# Patient Record
Sex: MALE | URBAN - METROPOLITAN AREA
[De-identification: names, ages, dates, MRNs, and addresses within clinical notes are randomized per-mention and may not be internally consistent; named-entity substitution may affect disease eponyms.]

---

## 2020-03-02 PROBLEM — Z00.00 ENCOUNTER FOR PREVENTIVE HEALTH EXAMINATION: Status: ACTIVE | Noted: 2020-03-02

## 2020-03-04 ENCOUNTER — OUTPATIENT (OUTPATIENT)
Dept: OUTPATIENT SERVICES | Facility: HOSPITAL | Age: 85
LOS: 1 days | End: 2020-03-04
Payer: MEDICARE

## 2020-03-04 ENCOUNTER — APPOINTMENT (OUTPATIENT)
Dept: CT IMAGING | Facility: HOSPITAL | Age: 85
End: 2020-03-04

## 2020-03-04 DIAGNOSIS — Z00.8 ENCOUNTER FOR OTHER GENERAL EXAMINATION: ICD-10-CM

## 2020-03-04 PROCEDURE — 71250 CT THORAX DX C-: CPT | Mod: 26

## 2020-03-04 PROCEDURE — 71250 CT THORAX DX C-: CPT

## 2022-05-25 ENCOUNTER — INPATIENT (INPATIENT)
Facility: HOSPITAL | Age: 87
LOS: 15 days | Discharge: NURSING, REHAB OR LONG-TERM CARE | DRG: 435 | End: 2022-06-10
Attending: HOSPITALIST | Admitting: STUDENT IN AN ORGANIZED HEALTH CARE EDUCATION/TRAINING PROGRAM
Payer: MEDICARE

## 2022-05-25 VITALS
TEMPERATURE: 98 F | SYSTOLIC BLOOD PRESSURE: 92 MMHG | OXYGEN SATURATION: 93 % | HEART RATE: 68 BPM | RESPIRATION RATE: 18 BRPM | DIASTOLIC BLOOD PRESSURE: 55 MMHG

## 2022-05-25 DIAGNOSIS — R62.7 ADULT FAILURE TO THRIVE: ICD-10-CM

## 2022-05-25 LAB
ALBUMIN SERPL ELPH-MCNC: 3 G/DL — LOW (ref 3.3–5)
ALP SERPL-CCNC: 110 U/L — SIGNIFICANT CHANGE UP (ref 40–120)
ALT FLD-CCNC: 12 U/L — SIGNIFICANT CHANGE UP (ref 10–45)
AMMONIA BLD-MCNC: 16 UMOL/L — SIGNIFICANT CHANGE UP (ref 11–55)
ANION GAP SERPL CALC-SCNC: 12 MMOL/L — SIGNIFICANT CHANGE UP (ref 5–17)
APPEARANCE UR: CLEAR — SIGNIFICANT CHANGE UP
APTT BLD: 32.4 SEC — SIGNIFICANT CHANGE UP (ref 27.5–35.5)
AST SERPL-CCNC: 14 U/L — SIGNIFICANT CHANGE UP (ref 10–40)
BACTERIA # UR AUTO: NEGATIVE — SIGNIFICANT CHANGE UP
BASOPHILS # BLD AUTO: 0.02 K/UL — SIGNIFICANT CHANGE UP (ref 0–0.2)
BASOPHILS NFR BLD AUTO: 0.3 % — SIGNIFICANT CHANGE UP (ref 0–2)
BILIRUB DIRECT SERPL-MCNC: 0.1 MG/DL — SIGNIFICANT CHANGE UP (ref 0–0.3)
BILIRUB INDIRECT FLD-MCNC: 0.4 MG/DL — SIGNIFICANT CHANGE UP (ref 0.2–1)
BILIRUB SERPL-MCNC: 0.5 MG/DL — SIGNIFICANT CHANGE UP (ref 0.2–1.2)
BILIRUB SERPL-MCNC: 0.5 MG/DL — SIGNIFICANT CHANGE UP (ref 0.2–1.2)
BILIRUB UR-MCNC: ABNORMAL
BUN SERPL-MCNC: 21 MG/DL — SIGNIFICANT CHANGE UP (ref 7–23)
CALCIUM SERPL-MCNC: 9.4 MG/DL — SIGNIFICANT CHANGE UP (ref 8.4–10.5)
CHLORIDE SERPL-SCNC: 110 MMOL/L — HIGH (ref 96–108)
CO2 SERPL-SCNC: 18 MMOL/L — LOW (ref 22–31)
COD CRY URNS QL: ABNORMAL
COLOR SPEC: YELLOW — SIGNIFICANT CHANGE UP
CREAT SERPL-MCNC: 1.3 MG/DL — SIGNIFICANT CHANGE UP (ref 0.5–1.3)
DIFF PNL FLD: ABNORMAL
EGFR: 51 ML/MIN/1.73M2 — LOW
EOSINOPHIL # BLD AUTO: 0.01 K/UL — SIGNIFICANT CHANGE UP (ref 0–0.5)
EOSINOPHIL NFR BLD AUTO: 0.1 % — SIGNIFICANT CHANGE UP (ref 0–6)
EPI CELLS # UR: 5 /HPF — SIGNIFICANT CHANGE UP
GLUCOSE SERPL-MCNC: 104 MG/DL — HIGH (ref 70–99)
GLUCOSE UR QL: NEGATIVE — SIGNIFICANT CHANGE UP
HCT VFR BLD CALC: 35.6 % — LOW (ref 39–50)
HGB BLD-MCNC: 10.5 G/DL — LOW (ref 13–17)
HYALINE CASTS # UR AUTO: 35 /LPF — HIGH (ref 0–2)
IMM GRANULOCYTES NFR BLD AUTO: 0.6 % — SIGNIFICANT CHANGE UP (ref 0–1.5)
INR BLD: 1.17 RATIO — HIGH (ref 0.88–1.16)
KETONES UR-MCNC: NEGATIVE — SIGNIFICANT CHANGE UP
LEUKOCYTE ESTERASE UR-ACNC: NEGATIVE — SIGNIFICANT CHANGE UP
LIDOCAIN IGE QN: 25 U/L — SIGNIFICANT CHANGE UP (ref 7–60)
LYMPHOCYTES # BLD AUTO: 0.73 K/UL — LOW (ref 1–3.3)
LYMPHOCYTES # BLD AUTO: 10.4 % — LOW (ref 13–44)
MAGNESIUM SERPL-MCNC: 2 MG/DL — SIGNIFICANT CHANGE UP (ref 1.6–2.6)
MCHC RBC-ENTMCNC: 27.1 PG — SIGNIFICANT CHANGE UP (ref 27–34)
MCHC RBC-ENTMCNC: 29.5 GM/DL — LOW (ref 32–36)
MCV RBC AUTO: 92 FL — SIGNIFICANT CHANGE UP (ref 80–100)
MONOCYTES # BLD AUTO: 0.74 K/UL — SIGNIFICANT CHANGE UP (ref 0–0.9)
MONOCYTES NFR BLD AUTO: 10.5 % — SIGNIFICANT CHANGE UP (ref 2–14)
NEUTROPHILS # BLD AUTO: 5.48 K/UL — SIGNIFICANT CHANGE UP (ref 1.8–7.4)
NEUTROPHILS NFR BLD AUTO: 78.1 % — HIGH (ref 43–77)
NITRITE UR-MCNC: NEGATIVE — SIGNIFICANT CHANGE UP
NRBC # BLD: 0 /100 WBCS — SIGNIFICANT CHANGE UP (ref 0–0)
PH UR: 6 — SIGNIFICANT CHANGE UP (ref 5–8)
PLATELET # BLD AUTO: 165 K/UL — SIGNIFICANT CHANGE UP (ref 150–400)
POTASSIUM SERPL-MCNC: 4 MMOL/L — SIGNIFICANT CHANGE UP (ref 3.5–5.3)
POTASSIUM SERPL-SCNC: 4 MMOL/L — SIGNIFICANT CHANGE UP (ref 3.5–5.3)
PROT SERPL-MCNC: 6.3 G/DL — SIGNIFICANT CHANGE UP (ref 6–8.3)
PROT UR-MCNC: ABNORMAL
PROTHROM AB SERPL-ACNC: 13.6 SEC — HIGH (ref 10.5–13.4)
RBC # BLD: 3.87 M/UL — LOW (ref 4.2–5.8)
RBC # FLD: 16.1 % — HIGH (ref 10.3–14.5)
RBC CASTS # UR COMP ASSIST: 4 /HPF — SIGNIFICANT CHANGE UP (ref 0–4)
SARS-COV-2 RNA SPEC QL NAA+PROBE: SIGNIFICANT CHANGE UP
SODIUM SERPL-SCNC: 140 MMOL/L — SIGNIFICANT CHANGE UP (ref 135–145)
SP GR SPEC: 1.02 — SIGNIFICANT CHANGE UP (ref 1.01–1.02)
UROBILINOGEN FLD QL: NEGATIVE — SIGNIFICANT CHANGE UP
WBC # BLD: 7.02 K/UL — SIGNIFICANT CHANGE UP (ref 3.8–10.5)
WBC # FLD AUTO: 7.02 K/UL — SIGNIFICANT CHANGE UP (ref 3.8–10.5)
WBC UR QL: 6 /HPF — HIGH (ref 0–5)

## 2022-05-25 PROCEDURE — 99223 1ST HOSP IP/OBS HIGH 75: CPT | Mod: 25

## 2022-05-25 PROCEDURE — 74177 CT ABD & PELVIS W/CONTRAST: CPT | Mod: 26,MA

## 2022-05-25 PROCEDURE — 99285 EMERGENCY DEPT VISIT HI MDM: CPT

## 2022-05-25 PROCEDURE — 70450 CT HEAD/BRAIN W/O DYE: CPT | Mod: 26,MA

## 2022-05-25 PROCEDURE — 71250 CT THORAX DX C-: CPT | Mod: 26,MA

## 2022-05-25 PROCEDURE — 99497 ADVNCD CARE PLAN 30 MIN: CPT | Mod: 25

## 2022-05-25 NOTE — H&P ADULT - HISTORY OF PRESENT ILLNESS
89M former smoker w/ copd, CAD s/p CABG, hypothyroid presents for evaluation of chronic diarrhea and weight loss. The patient reports that over the course of 1 year he has lost over 60lb and over the last 3-4mo he has experienced nonbloody watery diarrhea. He reports seeking medical attention at a nearby hospital but cannot recall the name ([place] vs [facility]?) and was told that he had spots on his liver and that he would need to follow up with his pcp. He reports not following with his pcp and not seeing his doctor in some time. No reported cancer in family. He has been experiencing fatigue, loss of appetite and dry cough as well. No reported fever, chills, cp, sob, n/v, or painful urination. Significantly, the patient lives with an  older sister (Poppy age 99) and has been having difficulty caring for himself. He reports being concerned he may have cancer and would want to know if anything could be done but if chances of remission are limited then he would not want aggressive care and would want to be "let go."    Of note, he does not know his home medications but says pharmacy is in [place]. He would want his niece Nel to be involved with his care and asks for her to be called tomorrow.

## 2022-05-25 NOTE — ED PROVIDER NOTE - CLINICAL SUMMARY MEDICAL DECISION MAKING FREE TEXT BOX
Marta: 89 year old male with diarrhea and weight loss over past few months. Patient very poor historian. states no pain. dry mm. will get labs, ivf, ct a/p, attempt to reach family for collateral, reassess Marta: 89 year old male with diarrhea and weight loss over past few months. Patient very poor historian. states no pain. dry mm. will get labs, ivf, ct a/p, attempt to reach family for collateral, reassess    Patient is a 89 year old male with past medical history significant for unspecified liver mass presents to the Emergency Department with chief complaint of diarrhea.  Patient was evaluated for infectious and intra-abdominal pathology.  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  Niece unaware of diagnosis and states patient has been getting worse at home and unable to care for himself.

## 2022-05-25 NOTE — ED ADULT NURSE NOTE - OBJECTIVE STATEMENT
PT is a 89 year old A&OX3 male with PMH of COPD, HLD, hypothyroidism, heart disease, and is extremely hard of hearing but does not have hearing aids. PT presents to the ED via EMS with c/o diarrhea for 5 months. Per EMS, PT "has been having diarrhea for months and his niece took him to another hospital where they did a liver biopsy with unknown results." PT is very poor historian and has no family at the bedside. Per EMS, there is no nicki blood in the stool and PT is incontinent. PT denies pain. PT is sitting up comfortably in bed, breathing unlabored, and speaking in short sentences. Abdomen is soft, non-tender, and non-distended. Skin is warm and dry, no diaphoresis noted. No edema noted to B/L extremities. PT placed in hospital gown, non-slip red socks applied. PT ambulatory with 1 assist. Safety and comfort maintained. PT is a 89 year old A&OX3 male with PMH of COPD, HLD, hypothyroidism, heart disease, and is extremely hard of hearing but does not have hearing aids. PT presents to the ED via EMS with c/o diarrhea for 5 months. Per EMS, PT "has been having diarrhea for months and his niece took him to another hospital where they did a liver biopsy with unknown results." PT is very poor historian and has no family at the bedside. Per EMS, there is no nicki blood in the stool and PT is incontinent. PT denies pain. PT is sitting up comfortably in bed, breathing unlabored, and speaking in short sentences. Abdomen is soft, non-tender, and non-distended. Skin is warm and dry, no diaphoresis noted. No edema noted to B/L extremities. Stage 2 pressure injury noted to left buttock. PT placed in hospital gown, non-slip red socks applied. PT ambulatory with 1 assist. Safety and comfort maintained.

## 2022-05-25 NOTE — H&P ADULT - NSHPREVIEWOFSYSTEMS_GEN_ALL_CORE
CONSTITUTIONAL: No fever, no chills, weight loss+  EYES: No eye pain, no acute blindness  ENMT: no pain in mouth, no cuts on tongue  RESPIRATORY: No cough, No sob  CARDIOVASCULAR: No CP, no palpitations  GASTROINTESTINAL: no abdominal pain, diarrhea+  GENITOURINARY: No dysuria, no hematuria  Heme/Lymph: No easy bruising, no swelling of neck lymph nodes  NEUROLOGICAL: No seizure, No acute paralysis  SKIN: No itching, no rashes  MUSCULOSKELETAL: No acute joint pain, no joint swelling

## 2022-05-25 NOTE — ED PROVIDER NOTE - PHYSICAL EXAMINATION
PHYSICAL EXAM:  GENERAL: + frail appearing   HEAD:  Atraumatic, Normocephalic  EYES: EOMI, PERRLA, conjunctiva and sclera clear  ENT: No erythema/pallor/petechiae/lesions; TMs clear b/l  NECK: Supple, No JVD  LUNG: CTA b/l; no r/r/w  HEART: RRR, +S1/S2; No m/r/g  ABDOMEN: soft, NT/ND; BS audible   EXTREMITIES:  2+ Peripheral Pulses, brisk cap refill. No clubbing, cyanosis, or edema  NERVOUS SYSTEM:  AAOx3, speech clear. No sensory/motor deficits   MSK: FROM all 4 extremities, full and equal strength  SKIN: No rashes or lesions

## 2022-05-25 NOTE — ED ADULT NURSE REASSESSMENT NOTE - NS ED NURSE REASSESS COMMENT FT1
pt reports urinating and small amount of stool passed. pt cleansed and changed. adjusted in bed for comfort

## 2022-05-25 NOTE — ED ADULT NURSE REASSESSMENT NOTE - NS ED NURSE REASSESS COMMENT FT1
PT straight catheterized under sterile technique with 2 RN's at the bedside as per MD order. PT tolerated well. Approximately 75 mL clear, yellow urine drained. UA/UC sent as per MD order. Safety and comfort maintained. Call bell within reach.

## 2022-05-25 NOTE — H&P ADULT - ASSESSMENT
89M admit for metastatic carcinoid tumor 89M former smoker w/ copd, CAD s/p CABG, hypothyroid presents for evaluation of chronic diarrhea and weight loss found to have imaging concerning for metastatic carcinoid tumor

## 2022-05-25 NOTE — ED ADULT NURSE NOTE - NSIMPLEMENTINTERV_GEN_ALL_ED
Implemented All Fall with Harm Risk Interventions:  [place] to call system. Call bell, personal items and telephone within reach. Instruct patient to call for assistance. Room bathroom lighting operational. Non-slip footwear when patient is off stretcher. Physically safe environment: no spills, clutter or unnecessary equipment. Stretcher in lowest position, wheels locked, appropriate side rails in place. Provide visual cue, wrist band, yellow gown, etc. Monitor gait and stability. Monitor for mental status changes and reorient to person, place, and time. Review medications for side effects contributing to fall risk. Reinforce activity limits and safety measures with patient and family. Provide visual clues: red socks.

## 2022-05-25 NOTE — H&P ADULT - PROBLEM SELECTOR PLAN 2
patient defers to niece regarding starting ac for now  CT a/p imaging concerning for possible DVT in R common femoral vein  lovenox at dvt ppx for now, would be best to readdress in the am

## 2022-05-25 NOTE — ED PROVIDER NOTE - OBJECTIVE STATEMENT
Patient is a 89 year old male with past medical history significant for unspecified liver mass presents to the Emergency Department with chief complaint of diarrhea.  Patient states for the past 3-4 months he has had constant diarrhea.  Patient reports less oral intake.  Patient lives at home with his niece, who states his physical status has declined.  Patient had a liver biopsy performed at an outside hospital, which was significant for carcinoid tumor.  Patient notes abdominal discomfort.  Patient denies sick contacts or recent travel.

## 2022-05-25 NOTE — H&P ADULT - PROBLEM SELECTOR PLAN 7
Health Care Decision Maker: Mr. Dustin Lutz (Patient- A&Ox3)    I had a face-to-face discussion with the patient for 20 minutes on 5/25->5/26 regarding advanced care planning. I provided a detailed explanation of advanced directives including CPR resuscitation, intubation, and life support measures. A MOLST form was completed and placed in the chart. The patient has determined that the harm of resuscitative measures outweighs perceived benefit and has designated code status to be DNR/DNI    Billing Code: 12869

## 2022-05-25 NOTE — ED PROVIDER NOTE - PROGRESS NOTE DETAILS
DO Scott: patient's niece contacted at [phone] and stated she was unaware of diagnosis and patient not following with a particular primary doctor. Jadon PGY 1 Received sign out regarding patient, will follow up with labs, imaging.   Currently will admit for FTT, Jadon PGY 1 Spoke w/ HCP no AC at this time understands risk of clot

## 2022-05-25 NOTE — H&P ADULT - NSHPPHYSICALEXAM_GEN_ALL_CORE
Vital Signs Last 24 Hrs  T(C): 36.3 (26 May 2022 00:31), Max: 36.5 (25 May 2022 22:58)  T(F): 97.4 (26 May 2022 00:31), Max: 97.7 (25 May 2022 22:58)  HR: 54 (26 May 2022 00:31) (54 - 70)  BP: 122/79 (26 May 2022 00:31) (92/55 - 153/66)  BP(mean): 95 (25 May 2022 22:58) (74 - 95)  RR: 20 (26 May 2022 00:31) (18 - 20)  SpO2: 98% (26 May 2022 00:31) (93% - 100%) on RA    GENERAL: NAD, cachectic  HEAD:  Atraumatic, Normocephalic  EYES: EOMI, PERRL, conjunctiva and sclera clear  ENMT: MMM, good dentition  NECK: Supple, trachea midline  Lung: normal work of breathing, cta b/l  Cardiovascular: S1&S2+, rrr, no m/r/g appreciated; no pitting edema appreciated in b/l LE  ABDOMEN: bs+, soft, nt, nd, no appreciable masses  : No van catheter, no CVA tenderness  Neuro: A&Ox3, no flaccid paralysis in extremities appreciated  SKIN: warm and dry, no visible purulence in exposed areas, normal skin turgor, decubitus ulcer

## 2022-05-25 NOTE — H&P ADULT - NSHPLABSRESULTS_GEN_ALL_CORE
Personally reviewed available labs, imaging and ekg  [1]  CBC Full  -  ( 25 May 2022 16:13 )  WBC Count : 7.02 K/uL  RBC Count : 3.87 M/uL  Hemoglobin : 10.5 g/dL  Hematocrit : 35.6 %  Platelet Count - Automated : 165 K/uL  Mean Cell Volume : 92.0 fl  Mean Cell Hemoglobin : 27.1 pg  Mean Cell Hemoglobin Concentration : 29.5 gm/dL  Auto Neutrophil # : 5.48 K/uL  Auto Lymphocyte # : 0.73 K/uL  Auto Monocyte # : 0.74 K/uL  Auto Eosinophil # : 0.01 K/uL  Auto Basophil # : 0.02 K/uL  Auto Neutrophil % : 78.1 %  Auto Lymphocyte % : 10.4 %  Auto Monocyte % : 10.5 %  Auto Eosinophil % : 0.1 %  Auto Basophil % : 0.3 %    05-25    140  |  110<H>  |  21  ----------------------------<  104<H>  4.0   |  18<L>  |  1.30    Ca    9.4      25 May 2022 16:13  Mg     2.0     05-25    TPro  6.3  /  Alb  3.0<L>  /  TBili  0.5  /  DBili  0.1  /  AST  14  /  ALT  12  /  AlkPhos  110  05-25    PT/INR - ( 25 May 2022 17:33 )   PT: 13.6 sec;   INR: 1.17 ratio         PTT - ( 25 May 2022 17:33 )  PTT:32.4 sec  Imaging:  [ 2] I independently reviewed CT abdomen concerning ofr metastatic cancer  EKG:  [2] I independently reviewed EKG/cardiac tracing and sinus present

## 2022-05-25 NOTE — ED PROVIDER NOTE - NS ED ROS FT
CONSTITUTIONAL: No weakness, fevers or chills  EYES/ENT: No visual changes;  No vertigo or throat pain   NECK: No pain or stiffness  RESPIRATORY: No cough, wheezing, hemoptysis; No shortness of breath  CARDIOVASCULAR: No chest pain or palpitations  GASTROINTESTINAL: + diarrhea, abdominal pain  GENITOURINARY: No dysuria, frequency or hematuria  NEUROLOGICAL: No numbness or weakness  SKIN: No itching, burning, rashes, or lesions   All other review of systems is negative unless indicated above.

## 2022-05-25 NOTE — H&P ADULT - PROBLEM SELECTOR PLAN 1
imaging concerning for met carcinoid  patient would want to speak with oncologist prior to considering biopsy to determine if therapy would be reasonable at his age- onc consult placed via email

## 2022-05-25 NOTE — H&P ADULT - NSHPADDITIONALINFOADULT_GEN_ALL_CORE
full h&p to follow    Sohan Walters MD  Medicine Attending  Department of Hospital Medicine  pager: [phone] (available from 20:00 to 08:00) Sohan Walters MD  Medicine Attending  Department of Hospital Medicine  pager: [phone] (available from 20:00 to 08:00)

## 2022-05-26 DIAGNOSIS — Z71.89 OTHER SPECIFIED COUNSELING: ICD-10-CM

## 2022-05-26 DIAGNOSIS — Z95.1 PRESENCE OF AORTOCORONARY BYPASS GRAFT: Chronic | ICD-10-CM

## 2022-05-26 DIAGNOSIS — Z79.899 OTHER LONG TERM (CURRENT) DRUG THERAPY: ICD-10-CM

## 2022-05-26 DIAGNOSIS — D3A.00 BENIGN CARCINOID TUMOR OF UNSPECIFIED SITE: ICD-10-CM

## 2022-05-26 DIAGNOSIS — Z29.9 ENCOUNTER FOR PROPHYLACTIC MEASURES, UNSPECIFIED: ICD-10-CM

## 2022-05-26 DIAGNOSIS — I82.409 ACUTE EMBOLISM AND THROMBOSIS OF UNSPECIFIED DEEP VEINS OF UNSPECIFIED LOWER EXTREMITY: ICD-10-CM

## 2022-05-26 DIAGNOSIS — R09.89 OTHER SPECIFIED SYMPTOMS AND SIGNS INVOLVING THE CIRCULATORY AND RESPIRATORY SYSTEMS: ICD-10-CM

## 2022-05-26 DIAGNOSIS — D64.9 ANEMIA, UNSPECIFIED: ICD-10-CM

## 2022-05-26 DIAGNOSIS — E03.9 HYPOTHYROIDISM, UNSPECIFIED: ICD-10-CM

## 2022-05-26 DIAGNOSIS — L89.90 PRESSURE ULCER OF UNSPECIFIED SITE, UNSPECIFIED STAGE: ICD-10-CM

## 2022-05-26 DIAGNOSIS — J43.9 EMPHYSEMA, UNSPECIFIED: ICD-10-CM

## 2022-05-26 LAB
ALBUMIN SERPL ELPH-MCNC: 2.8 G/DL — LOW (ref 3.3–5)
ALP SERPL-CCNC: 107 U/L — SIGNIFICANT CHANGE UP (ref 40–120)
ALT FLD-CCNC: 11 U/L — SIGNIFICANT CHANGE UP (ref 10–45)
ANION GAP SERPL CALC-SCNC: 9 MMOL/L — SIGNIFICANT CHANGE UP (ref 5–17)
APTT BLD: 34.7 SEC — SIGNIFICANT CHANGE UP (ref 27.5–35.5)
AST SERPL-CCNC: 11 U/L — SIGNIFICANT CHANGE UP (ref 10–40)
BASOPHILS # BLD AUTO: 0.02 K/UL — SIGNIFICANT CHANGE UP (ref 0–0.2)
BASOPHILS NFR BLD AUTO: 0.3 % — SIGNIFICANT CHANGE UP (ref 0–2)
BILIRUB SERPL-MCNC: 0.5 MG/DL — SIGNIFICANT CHANGE UP (ref 0.2–1.2)
BUN SERPL-MCNC: 19 MG/DL — SIGNIFICANT CHANGE UP (ref 7–23)
C DIFF GDH STL QL: NEGATIVE — SIGNIFICANT CHANGE UP
C DIFF GDH STL QL: SIGNIFICANT CHANGE UP
CALCIUM SERPL-MCNC: 9.2 MG/DL — SIGNIFICANT CHANGE UP (ref 8.4–10.5)
CHLORIDE SERPL-SCNC: 109 MMOL/L — HIGH (ref 96–108)
CO2 SERPL-SCNC: 21 MMOL/L — LOW (ref 22–31)
CREAT SERPL-MCNC: 1.11 MG/DL — SIGNIFICANT CHANGE UP (ref 0.5–1.3)
CULTURE RESULTS: SIGNIFICANT CHANGE UP
EGFR: 62 ML/MIN/1.73M2 — SIGNIFICANT CHANGE UP
EOSINOPHIL # BLD AUTO: 0.02 K/UL — SIGNIFICANT CHANGE UP (ref 0–0.5)
EOSINOPHIL NFR BLD AUTO: 0.3 % — SIGNIFICANT CHANGE UP (ref 0–6)
FERRITIN SERPL-MCNC: 67 NG/ML — SIGNIFICANT CHANGE UP (ref 30–400)
FOLATE SERPL-MCNC: 16.1 NG/ML — SIGNIFICANT CHANGE UP
GLUCOSE SERPL-MCNC: 80 MG/DL — SIGNIFICANT CHANGE UP (ref 70–99)
HCT VFR BLD CALC: 35.1 % — LOW (ref 39–50)
HGB BLD-MCNC: 10.6 G/DL — LOW (ref 13–17)
IMM GRANULOCYTES NFR BLD AUTO: 0.5 % — SIGNIFICANT CHANGE UP (ref 0–1.5)
INR BLD: 1.15 RATIO — SIGNIFICANT CHANGE UP (ref 0.88–1.16)
IRON SATN MFR SERPL: 17 % — SIGNIFICANT CHANGE UP (ref 16–55)
IRON SATN MFR SERPL: 32 UG/DL — LOW (ref 45–165)
LYMPHOCYTES # BLD AUTO: 1.1 K/UL — SIGNIFICANT CHANGE UP (ref 1–3.3)
LYMPHOCYTES # BLD AUTO: 17.5 % — SIGNIFICANT CHANGE UP (ref 13–44)
MCHC RBC-ENTMCNC: 27.6 PG — SIGNIFICANT CHANGE UP (ref 27–34)
MCHC RBC-ENTMCNC: 30.2 GM/DL — LOW (ref 32–36)
MCV RBC AUTO: 91.4 FL — SIGNIFICANT CHANGE UP (ref 80–100)
MONOCYTES # BLD AUTO: 0.85 K/UL — SIGNIFICANT CHANGE UP (ref 0–0.9)
MONOCYTES NFR BLD AUTO: 13.5 % — SIGNIFICANT CHANGE UP (ref 2–14)
NEUTROPHILS # BLD AUTO: 4.27 K/UL — SIGNIFICANT CHANGE UP (ref 1.8–7.4)
NEUTROPHILS NFR BLD AUTO: 67.9 % — SIGNIFICANT CHANGE UP (ref 43–77)
NRBC # BLD: 0 /100 WBCS — SIGNIFICANT CHANGE UP (ref 0–0)
PLATELET # BLD AUTO: 151 K/UL — SIGNIFICANT CHANGE UP (ref 150–400)
POTASSIUM SERPL-MCNC: 3.9 MMOL/L — SIGNIFICANT CHANGE UP (ref 3.5–5.3)
POTASSIUM SERPL-SCNC: 3.9 MMOL/L — SIGNIFICANT CHANGE UP (ref 3.5–5.3)
PROT SERPL-MCNC: 5.9 G/DL — LOW (ref 6–8.3)
PROTHROM AB SERPL-ACNC: 13.4 SEC — SIGNIFICANT CHANGE UP (ref 10.5–13.4)
RBC # BLD: 3.84 M/UL — LOW (ref 4.2–5.8)
RBC # BLD: 3.84 M/UL — LOW (ref 4.2–5.8)
RBC # FLD: 16.1 % — HIGH (ref 10.3–14.5)
RETICS #: 57.6 K/UL — SIGNIFICANT CHANGE UP (ref 25–125)
RETICS/RBC NFR: 1.5 % — SIGNIFICANT CHANGE UP (ref 0.5–2.5)
SODIUM SERPL-SCNC: 139 MMOL/L — SIGNIFICANT CHANGE UP (ref 135–145)
SPECIMEN SOURCE: SIGNIFICANT CHANGE UP
TIBC SERPL-MCNC: 189 UG/DL — LOW (ref 220–430)
TSH SERPL-MCNC: 0.93 UIU/ML — SIGNIFICANT CHANGE UP (ref 0.27–4.2)
UIBC SERPL-MCNC: 157 UG/DL — SIGNIFICANT CHANGE UP (ref 110–370)
VIT B12 SERPL-MCNC: 818 PG/ML — SIGNIFICANT CHANGE UP (ref 232–1245)
WBC # BLD: 6.29 K/UL — SIGNIFICANT CHANGE UP (ref 3.8–10.5)
WBC # FLD AUTO: 6.29 K/UL — SIGNIFICANT CHANGE UP (ref 3.8–10.5)

## 2022-05-26 PROCEDURE — 99233 SBSQ HOSP IP/OBS HIGH 50: CPT

## 2022-05-26 PROCEDURE — 99222 1ST HOSP IP/OBS MODERATE 55: CPT | Mod: GC

## 2022-05-26 RX ORDER — LEVOTHYROXINE SODIUM 125 MCG
88 TABLET ORAL DAILY
Refills: 0 | Status: DISCONTINUED | OUTPATIENT
Start: 2022-05-26 | End: 2022-06-10

## 2022-05-26 RX ORDER — LOPERAMIDE HCL 2 MG
2 TABLET ORAL EVERY 4 HOURS
Refills: 0 | Status: DISCONTINUED | OUTPATIENT
Start: 2022-05-26 | End: 2022-05-26

## 2022-05-26 RX ORDER — FINASTERIDE 5 MG/1
5 TABLET, FILM COATED ORAL DAILY
Refills: 0 | Status: DISCONTINUED | OUTPATIENT
Start: 2022-05-26 | End: 2022-06-10

## 2022-05-26 RX ORDER — ATORVASTATIN CALCIUM 80 MG/1
80 TABLET, FILM COATED ORAL AT BEDTIME
Refills: 0 | Status: DISCONTINUED | OUTPATIENT
Start: 2022-05-26 | End: 2022-06-10

## 2022-05-26 RX ORDER — ASPIRIN/CALCIUM CARB/MAGNESIUM 324 MG
81 TABLET ORAL DAILY
Refills: 0 | Status: DISCONTINUED | OUTPATIENT
Start: 2022-05-26 | End: 2022-05-27

## 2022-05-26 RX ORDER — ENOXAPARIN SODIUM 100 MG/ML
40 INJECTION SUBCUTANEOUS EVERY 24 HOURS
Refills: 0 | Status: DISCONTINUED | OUTPATIENT
Start: 2022-05-26 | End: 2022-05-30

## 2022-05-26 RX ORDER — ISOSORBIDE MONONITRATE 60 MG/1
30 TABLET, EXTENDED RELEASE ORAL DAILY
Refills: 0 | Status: DISCONTINUED | OUTPATIENT
Start: 2022-05-26 | End: 2022-06-03

## 2022-05-26 RX ORDER — METOPROLOL TARTRATE 50 MG
25 TABLET ORAL DAILY
Refills: 0 | Status: DISCONTINUED | OUTPATIENT
Start: 2022-05-26 | End: 2022-06-03

## 2022-05-26 RX ADMIN — ATORVASTATIN CALCIUM 80 MILLIGRAM(S): 80 TABLET, FILM COATED ORAL at 21:25

## 2022-05-26 RX ADMIN — ENOXAPARIN SODIUM 40 MILLIGRAM(S): 100 INJECTION SUBCUTANEOUS at 06:38

## 2022-05-26 RX ADMIN — Medication 81 MILLIGRAM(S): at 16:28

## 2022-05-26 RX ADMIN — Medication 25 MILLIGRAM(S): at 17:29

## 2022-05-26 RX ADMIN — FINASTERIDE 5 MILLIGRAM(S): 5 TABLET, FILM COATED ORAL at 17:29

## 2022-05-26 RX ADMIN — ISOSORBIDE MONONITRATE 30 MILLIGRAM(S): 60 TABLET, EXTENDED RELEASE ORAL at 16:28

## 2022-05-26 NOTE — PROGRESS NOTE ADULT - SUBJECTIVE AND OBJECTIVE BOX
Patient is a 89y old  Male who presents with a chief complaint of 89M p/w chronic diarrhea and weight loss (25 May 2022 23:12)      SUBJECTIVE / OVERNIGHT EVENTS: Patient seen and examined at bedside. States that he feels ok, denies any CP, SOB, abd pain and n/v.     ROS:  All other review of systems negative    Allergies    No Known Allergies    Intolerances        MEDICATIONS  (STANDING):  enoxaparin Injectable 40 milliGRAM(s) SubCutaneous every 24 hours    MEDICATIONS  (PRN):  loperamide 2 milliGRAM(s) Oral every 4 hours PRN Diarrhea      Vital Signs Last 24 Hrs  T(C): 36.7 (26 May 2022 10:59), Max: 36.7 (26 May 2022 10:59)  T(F): 98 (26 May 2022 10:59), Max: 98 (26 May 2022 10:59)  HR: 58 (26 May 2022 10:59) (54 - 70)  BP: 113/69 (26 May 2022 10:59) (92/55 - 153/66)  BP(mean): 95 (25 May 2022 22:58) (74 - 95)  RR: 18 (26 May 2022 10:59) (18 - 20)  SpO2: 98% (26 May 2022 10:59) (93% - 100%)  CAPILLARY BLOOD GLUCOSE        I&O's Summary      PHYSICAL EXAM:  GENERAL: thin, elderly, [place]  HEAD:  Atraumatic, Normocephalic  EYES: EOMI, PERRLA, conjunctiva and sclera clear  NECK: Supple, No JVD  CHEST/LUNG: Clear to auscultation bilaterally; No wheeze  HEART: Regular rate and rhythm; No murmurs, rubs, or gallops  ABDOMEN: Soft, Nontender, Nondistended; Bowel sounds present  EXTREMITIES:  2+ Peripheral Pulses, No clubbing, cyanosis, or edema  NEUROLOGY: AAOx3, non-focal  PSYCH: calm  SKIN: No rashes or lesions    LABS:                        10.6   6.29  )-----------( 151      ( 26 May 2022 06:09 )             35.1     -    139  |  109<H>  |  19  ----------------------------<  80  3.9   |  21<L>  |  1.11    Ca    9.2      26 May 2022 06:09  Mg     2.0     -    TPro  5.9<L>  /  Alb  2.8<L>  /  TBili  0.5  /  DBili  x   /  AST  11  /  ALT  11  /  AlkPhos  107  -    PT/INR - ( 26 May 2022 06:09 )   PT: 13.4 sec;   INR: 1.15 ratio         PTT - ( 26 May 2022 06:09 )  PTT:34.7 sec      Urinalysis Basic - ( 25 May 2022 16:32 )    Color: Yellow / Appearance: Clear / S.025 / pH: x  Gluc: x / Ketone: Negative  / Bili: Small / Urobili: Negative   Blood: x / Protein: 100 mg/dL / Nitrite: Negative   Leuk Esterase: Negative / RBC: 4 /hpf / WBC 6 /HPF   Sq Epi: x / Non Sq Epi: 5 /hpf / Bacteria: Negative        RADIOLOGY & ADDITIONAL TESTS:    Care Discussed with Consultants/Other Providers: Medicine ACP

## 2022-05-26 NOTE — CONSULT NOTE ADULT - SUBJECTIVE AND OBJECTIVE BOX
Wound Surgery Consult Note:    HPI:  89M former smoker w/ copd, CAD s/p CABG, hypothyroid presents for evaluation of chronic diarrhea and weight loss. The patient reports that over the course of 1 year he has lost over 60lb and over the last 3-4mo he has experienced nonbloody watery diarrhea. He reports seeking medical attention at a nearby hospital but cannot recall the name ([place] vs [facility]?) and was told that he had spots on his liver and that he would need to follow up with his pcp. He reports not following with his pcp and not seeing his doctor in some time. No reported cancer in family. He has been experiencing fatigue, loss of appetite and dry cough as well. No reported fever, chills, cp, sob, n/v, or painful urination. Significantly, the patient lives with an  older sister (Poppy age 99) and has been having difficulty caring for himself. He reports being concerned he may have cancer and would want to know if anything could be done but if chances of remission are limited then he would not want aggressive care and would want to be "let go."    Of note, he does not know his home medications but says pharmacy is in [place]. He would want his niece Nel to be involved with his care and asks for her to be called tomorrow. (25 May 2022 23:12)    Request for wound care consult for sacral/bilateral buttocks skin breakdown received from nursing. Mr. reynolds was encountered on an alternating air with low air loss surface. She is grossly incontinent of mushy and urine.  His extreme immobility, inactivity, gross incontinence of urine and stool as well as poor nutritional status all contribute to his high risk for pressure injury development and hinder healing. Identification of the initial signs of deep tissue damage at the level of the skin within 72 hours of admission make this an injury that occurred prior to admission.    PAST MEDICAL & SURGICAL HISTORY:  COPD, mild  Hypothyroid  CAD in native artery  S/P CABG (coronary artery bypass graft)    MEDICATIONS  (STANDING):  enoxaparin Injectable 40 milliGRAM(s) SubCutaneous every 24 hours    MEDICATIONS  (PRN):  loperamide 2 milliGRAM(s) Oral every 4 hours PRN Diarrhea    Allergies    No Known Allergies    Intolerances    Vital Signs Last 24 Hrs  T(C): 36.7 (26 May 2022 10:59), Max: 36.7 (26 May 2022 10:59)  T(F): 98 (26 May 2022 10:59), Max: 98 (26 May 2022 10:59)  HR: 58 (26 May 2022 10:59) (54 - 70)  BP: 113/69 (26 May 2022 10:59) (92/55 - 153/66)  BP(mean): 95 (25 May 2022 22:58) (74 - 95)  RR: 18 (26 May 2022 10:59) (18 - 20)  SpO2: 98% (26 May 2022 10:59) (93% - 100%)    Physical Exam:  General: Alert, thin, weak  Respiratory: no SOB on room air  Gastrointestinal: soft NT/ND  Neurology: weakened strength & sensation grossly intact, verbal, following commands  Musculoskeletal: no contractures  Vascular: BLE edema equal  Skin:  Sacral/bilateral buttocks with central area of deep maroon intact skin L 5cm X W 5cm xD none, no drainage  No odor, erythema, increased warmth, tenderness, induration, fluctuance    LABS:      139  |  109<H>  |  19  ----------------------------<  80  3.9   |  21<L>  |  1.11    Ca    9.2      26 May 2022 06:09  Mg     2.0         TPro  5.9<L>  /  Alb  2.8<L>  /  TBili  0.5  /  DBili  x   /  AST  11  /  ALT  11  /  AlkPhos  107                            10.6   6.29  )-----------( 151      ( 26 May 2022 06:09 )             35.1     PT/INR - ( 26 May 2022 06:09 )   PT: 13.4 sec;   INR: 1.15 ratio         PTT - ( 26 May 2022 06:09 )  PTT:34.7 sec  Urinalysis Basic - ( 25 May 2022 16:32 )    Color: Yellow / Appearance: Clear / S.025 / pH: x  Gluc: x / Ketone: Negative  / Bili: Small / Urobili: Negative   Blood: x / Protein: 100 mg/dL / Nitrite: Negative   Leuk Esterase: Negative / RBC: 4 /hpf / WBC 6 /HPF   Sq Epi: x / Non Sq Epi: 5 /hpf / Bacteria: Negative            
HPI as per admitting team:   89M former smoker w/ copd, CAD s/p CABG, hypothyroid presents for evaluation of chronic diarrhea and weight loss. The patient reports that over the course of 1 year he has lost over 60lb and over the last 3-4mo he has experienced nonbloody watery diarrhea. He reports seeking medical attention at a nearby hospital but cannot recall the name ([place] vs [facility]?) and was told that he had spots on his liver and that he would need to follow up with his pcp. He reports not following with his pcp and not seeing his doctor in some time. No reported cancer in family. He has been experiencing fatigue, loss of appetite and dry cough as well. No reported fever, chills, cp, sob, n/v, or painful urination. Significantly, the patient lives with an  older sister (Poppy age 99) and has been having difficulty caring for himself. He reports being concerned he may have cancer and would want to know if anything could be done but if chances of remission are limited then he would not want aggressive care and would want to be "let go."    Of note, he does not know his home medications but says pharmacy is in [place]. He would want his niece Nel to be involved with his care and asks for her to be called tomorrow. (25 May 2022 23:12)        REVIEW OF SYSTEMS:  Limited as patient [place]    PAST MEDICAL & SURGICAL HISTORY:  COPD, mild  Hypothyroid  CAD in native artery  S/P CABG (coronary artery bypass graft)        FAMILY HISTORY:  No pertinent family history in first degree relatives      Allergies    No Known Allergies    Intolerances        MEDICATIONS  (STANDING):  aspirin enteric coated 81 milliGRAM(s) Oral daily  atorvastatin 80 milliGRAM(s) Oral at bedtime  enoxaparin Injectable 40 milliGRAM(s) SubCutaneous every 24 hours  finasteride 5 milliGRAM(s) Oral daily  isosorbide   mononitrate ER Tablet (IMDUR) 30 milliGRAM(s) Oral daily  levothyroxine 88 MICROGram(s) Oral daily  metoprolol succinate ER 25 milliGRAM(s) Oral daily    MEDICATIONS  (PRN):      OBJECTIVE   Height (cm): 177.8 (05-26 @ 04:48)    T(F): 98 (05-26-22 @ 16:09), Max: 98.3 (05-26-22 @ 14:44)  HR: 60 (05-26-22 @ 16:09)  BP: 105/65 (05-26-22 @ 16:09)  RR: 18 (05-26-22 @ 16:09)  SpO2: 97% (05-26-22 @ 16:09)  Wt(kg): --    PHYSICAL EXAM   GENERAL: NAD, well-developed  HEAD:  Atraumatic, Normocephalic  EYES: EOMI, PERRLA, conjunctiva and sclera clear  NECK: Supple, No JVD  CHEST/LUNG: Clear to auscultation bilaterally; No wheeze  HEART: Regular rate and rhythm; No murmurs, rubs, or gallops  ABDOMEN: Soft, Nontender, Nondistended; Bowel sounds present  EXTREMITIES:  2+ Peripheral Pulses, No clubbing, cyanosis, or edema  NEUROLOGY: non-focal  SKIN: No rashes or lesions                          10.6   6.29  )-----------( 151      ( 26 May 2022 06:09 )             35.1       05-26    139  |  109<H>  |  19  ----------------------------<  80  3.9   |  21<L>  |  1.11    Ca    9.2      26 May 2022 06:09  Mg     2.0     05-25    TPro  5.9<L>  /  Alb  2.8<L>  /  TBili  0.5  /  DBili  x   /  AST  11  /  ALT  11  /  AlkPhos  107  05-26      < from: CT Abdomen and Pelvis w/ IV Cont (05.25.22 @ 16:40) >    ACC: [id number] EXAM:  CT ABDOMEN AND PELVIS IC                          PROCEDURE DATE:  05/25/2022    IMPRESSION:  Spiculated mesenteric mass suspicious for carcinoid tumor.    Multiple bilobar hepatic metastases.    Indeterminate lesions of the spleen and left adrenal gland.    < end of copied text >  < from: CT Chest No Cont (05.25.22 @ 18:20) >    ACC: [id number] EXAM:  CT CHEST                          PROCEDURE DATE:  05/25/2022    IMPRESSION:    Left apical 0.3 cm nodule, left upper lobe 0.7 cm ground-glass nodule,   and clustered micronodules at the periphery of the right base;   indeterminate, but may be infectious/inflammatory in etiology. CT chest   follow-up in 3 months recommended to assess stability.    Small right pleural effusion and trace loculated left pleural effusion.    < end of copied text >

## 2022-05-26 NOTE — CONSULT NOTE ADULT - ATTENDING COMMENTS
89 M w/ findings consistent with metastatic cancer, possibly NET? p/w diarrhea and weight loss. d/w family re risk vs benefits of biopsy. Family in agreement to proceed with obtaining a diagnosis. Will f/u after path results.

## 2022-05-26 NOTE — PROGRESS NOTE ADULT - PROBLEM SELECTOR PLAN 2
patient defers to inocencia regarding starting ac for now  CT a/p imaging concerning for possible DVT in R common femoral vein  lovenox at dvt ppx for now,  d/w Inocencia Neumann in detail, states she will speak with patient regarding AC today-tomorrow prior to making final decision

## 2022-05-26 NOTE — PROGRESS NOTE ADULT - ASSESSMENT
89M former smoker w/ copd, CAD s/p CABG, hypothyroid presents for evaluation of chronic diarrhea and weight loss found to have imaging concerning for metastatic carcinoid tumor

## 2022-05-26 NOTE — CONSULT NOTE ADULT - ASSESSMENT
Impression:    Sacral/bilateral Buttocks deep tissue injury present on admission  Incontinence of bowel and bladder  Incontinence Dermatitis    Recommend:  1.) topical therapy: sacral/buttock injury - cleanse with incontinence cleanser, pat dry, apply Triad ointment BID and PRN for incontinent episodes  2.) Incontinence Management - incontinence cleanser, pads, pericare BID, avoid diapers  3.) Maintain on an alternating air with low air loss surface  4.) Turn and reposition Q 2 hours  5.) Nutrition optimization  6.) Offload heels/feet with complete cair air fluidized boots; ensure that the soles of the feet are not resting on the foot board of the bed.    Care as per medicine. Will not actively follow but will remain available. Please recall for new issues or deterioration.  Upon discharge [facility] [address] [place] [address] [phone]  Thank you for this consult  Yocasta Rudd, NEERU-C, CWOCN [id number]

## 2022-05-26 NOTE — CONSULT NOTE ADULT - ASSESSMENT
89M former smoker w/ copd, CAD s/p CABG, hypothyroid presents for evaluation of chronic diarrhea and weight loss found to have imaging concerning for metastatic carcinoid tumor.    Concern for Carcinoid   -CT scan with spiculated mesenteric mass suspicious for carcinoid tumor; multiple bilobar hepatic metastases. CT chest with non-specific pulm nodules  -Overall carcinoid  tumors are slowly growing tumors that tend to have a good 5 year prognosis even with mets. For local/ regional mets five year survival is >90%  -Discussed the above with patient's niece Nel who is on board with proceeding with biopsy. Explained that there is no guarantee that this is carcinoid; so will have to wait for path before moving forward  -recommend IR biopsy of liver lesion; of note patient had FNA bx of liver lesion at [facility] that was inconclusive. Please obtain a core biopsy

## 2022-05-27 LAB
ANION GAP SERPL CALC-SCNC: 10 MMOL/L — SIGNIFICANT CHANGE UP (ref 5–17)
BUN SERPL-MCNC: 17 MG/DL — SIGNIFICANT CHANGE UP (ref 7–23)
CALCIUM SERPL-MCNC: 9 MG/DL — SIGNIFICANT CHANGE UP (ref 8.4–10.5)
CHLORIDE SERPL-SCNC: 110 MMOL/L — HIGH (ref 96–108)
CO2 SERPL-SCNC: 22 MMOL/L — SIGNIFICANT CHANGE UP (ref 22–31)
CREAT SERPL-MCNC: 0.96 MG/DL — SIGNIFICANT CHANGE UP (ref 0.5–1.3)
EGFR: 73 ML/MIN/1.73M2 — SIGNIFICANT CHANGE UP
GLUCOSE SERPL-MCNC: 77 MG/DL — SIGNIFICANT CHANGE UP (ref 70–99)
HCT VFR BLD CALC: 34.4 % — LOW (ref 39–50)
HGB BLD-MCNC: 10.1 G/DL — LOW (ref 13–17)
MAGNESIUM SERPL-MCNC: 1.9 MG/DL — SIGNIFICANT CHANGE UP (ref 1.6–2.6)
MCHC RBC-ENTMCNC: 26.6 PG — LOW (ref 27–34)
MCHC RBC-ENTMCNC: 29.4 GM/DL — LOW (ref 32–36)
MCV RBC AUTO: 90.5 FL — SIGNIFICANT CHANGE UP (ref 80–100)
NRBC # BLD: 0 /100 WBCS — SIGNIFICANT CHANGE UP (ref 0–0)
PHOSPHATE SERPL-MCNC: 3 MG/DL — SIGNIFICANT CHANGE UP (ref 2.5–4.5)
PLATELET # BLD AUTO: 186 K/UL — SIGNIFICANT CHANGE UP (ref 150–400)
POTASSIUM SERPL-MCNC: 4.9 MMOL/L — SIGNIFICANT CHANGE UP (ref 3.5–5.3)
POTASSIUM SERPL-SCNC: 4.9 MMOL/L — SIGNIFICANT CHANGE UP (ref 3.5–5.3)
RBC # BLD: 3.8 M/UL — LOW (ref 4.2–5.8)
RBC # FLD: 16.4 % — HIGH (ref 10.3–14.5)
SODIUM SERPL-SCNC: 142 MMOL/L — SIGNIFICANT CHANGE UP (ref 135–145)
WBC # BLD: 8.62 K/UL — SIGNIFICANT CHANGE UP (ref 3.8–10.5)
WBC # FLD AUTO: 8.62 K/UL — SIGNIFICANT CHANGE UP (ref 3.8–10.5)

## 2022-05-27 PROCEDURE — 99233 SBSQ HOSP IP/OBS HIGH 50: CPT

## 2022-05-27 RX ORDER — LOPERAMIDE HCL 2 MG
2 TABLET ORAL EVERY 8 HOURS
Refills: 0 | Status: DISCONTINUED | OUTPATIENT
Start: 2022-05-27 | End: 2022-05-29

## 2022-05-27 RX ADMIN — ISOSORBIDE MONONITRATE 30 MILLIGRAM(S): 60 TABLET, EXTENDED RELEASE ORAL at 12:24

## 2022-05-27 RX ADMIN — Medication 25 MILLIGRAM(S): at 05:42

## 2022-05-27 RX ADMIN — Medication 81 MILLIGRAM(S): at 12:24

## 2022-05-27 RX ADMIN — Medication 2 MILLIGRAM(S): at 12:24

## 2022-05-27 RX ADMIN — ATORVASTATIN CALCIUM 80 MILLIGRAM(S): 80 TABLET, FILM COATED ORAL at 21:30

## 2022-05-27 RX ADMIN — Medication 88 MICROGRAM(S): at 05:42

## 2022-05-27 RX ADMIN — ENOXAPARIN SODIUM 40 MILLIGRAM(S): 100 INJECTION SUBCUTANEOUS at 05:42

## 2022-05-27 RX ADMIN — FINASTERIDE 5 MILLIGRAM(S): 5 TABLET, FILM COATED ORAL at 12:24

## 2022-05-27 NOTE — PROGRESS NOTE ADULT - SUBJECTIVE AND OBJECTIVE BOX
Patient is a 89y old  Male who presents with a chief complaint of 89M p/w chronic diarrhea and weight loss (26 May 2022 17:49)      SUBJECTIVE / OVERNIGHT EVENTS: Patient seen and examined at bedside. States that he feels well denies any CP, SOB, abd pain and n/v. No acute events overnight. Continues to have loose stools     ROS:  All other review of systems negative    Allergies    No Known Allergies    Intolerances        MEDICATIONS  (STANDING):  aspirin enteric coated 81 milliGRAM(s) Oral daily  atorvastatin 80 milliGRAM(s) Oral at bedtime  enoxaparin Injectable 40 milliGRAM(s) SubCutaneous every 24 hours  finasteride 5 milliGRAM(s) Oral daily  isosorbide   mononitrate ER Tablet (IMDUR) 30 milliGRAM(s) Oral daily  levothyroxine 88 MICROGram(s) Oral daily  metoprolol succinate ER 25 milliGRAM(s) Oral daily    MEDICATIONS  (PRN):  loperamide 2 milliGRAM(s) Oral every 8 hours PRN Diarrhea      Vital Signs Last 24 Hrs  T(C): 36.6 (27 May 2022 12:36), Max: 36.8 (26 May 2022 14:44)  T(F): 97.9 (27 May 2022 12:36), Max: 98.3 (26 May 2022 14:44)  HR: 56 (27 May 2022 12:36) (56 - 64)  BP: 114/63 (27 May 2022 12:36) (100/70 - 114/63)  BP(mean): --  RR: 18 (27 May 2022 12:36) (18 - 18)  SpO2: 97% (27 May 2022 12:36) (95% - 98%)  CAPILLARY BLOOD GLUCOSE        I&O's Summary    26 May 2022 07:01  -  27 May 2022 07:00  --------------------------------------------------------  IN: 600 mL / OUT: 0 mL / NET: 600 mL    27 May 2022 07:01  -  27 May 2022 13:25  --------------------------------------------------------  IN: 240 mL / OUT: 0 mL / NET: 240 mL        PHYSICAL EXAM:  GENERAL: thin, elderly, [place]  HEAD:  Atraumatic, Normocephalic  EYES: EOMI, PERRLA, conjunctiva and sclera clear  NECK: Supple, No JVD  CHEST/LUNG: Clear to auscultation bilaterally; No wheeze  HEART: Regular rate and rhythm; No murmurs, rubs, or gallops  ABDOMEN: Soft, Nontender, Nondistended; Bowel sounds present  EXTREMITIES:  2+ Peripheral Pulses, No clubbing, cyanosis, or edema  NEUROLOGY: AAOx3, non-focal  PSYCH: calm  SKIN: No rashes or lesions    LABS:                        10.1   8.62  )-----------( 186      ( 27 May 2022 07:20 )             34.4     05-27    142  |  110<H>  |  17  ----------------------------<  77  4.9   |  22  |  0.96    Ca    9.0      27 May 2022 07:20  Phos  3.0     05-  Mg     1.9     -    TPro  5.9<L>  /  Alb  2.8<L>  /  TBili  0.5  /  DBili  x   /  AST  11  /  ALT  11  /  AlkPhos  107  05-26    PT/INR - ( 26 May 2022 06:09 )   PT: 13.4 sec;   INR: 1.15 ratio         PTT - ( 26 May 2022 06:09 )  PTT:34.7 sec      Urinalysis Basic - ( 25 May 2022 16:32 )    Color: Yellow / Appearance: Clear / S.025 / pH: x  Gluc: x / Ketone: Negative  / Bili: Small / Urobili: Negative   Blood: x / Protein: 100 mg/dL / Nitrite: Negative   Leuk Esterase: Negative / RBC: 4 /hpf / WBC 6 /HPF   Sq Epi: x / Non Sq Epi: 5 /hpf / Bacteria: Negative        RADIOLOGY & ADDITIONAL TESTS:    Consultant(s) Notes Reviewed:  Wound care and Oncology rec noted     Care Discussed with Consultants/Other Providers: Medicine ACP

## 2022-05-27 NOTE — PATIENT PROFILE ADULT - FALL HARM RISK - HARM RISK INTERVENTIONS

## 2022-05-27 NOTE — PROGRESS NOTE ADULT - PROBLEM SELECTOR PLAN 2
patient defers to inocencia regarding starting ac for now  CT a/p imaging concerning for possible DVT in R common femoral vein  lovenox at dvt ppx for now,  d/w Inocencia Neumann in detail, states she will speak with patient regarding AC, currently undecided about plan

## 2022-05-27 NOTE — CONSULT NOTE ADULT - ASSESSMENT
Interventional Radiology    Evaluate for Procedure: Liver lesion biopsy    HPI:  89M former smoker w/ copd, CAD s/p CABG, hypothyroid presents for evaluation of chronic diarrhea and weight loss found to have imaging concerning for metastatic carcinoid tumor referred to IR for liver lesion biopsy.     Allergies:   Medications (Abx/Cardiac/Anticoagulation/Blood Products)  aspirin enteric coated: 81 milliGRAM(s) Oral (05-27 @ 12:24)  enoxaparin Injectable: 40 milliGRAM(s) SubCutaneous (05-27 @ 05:42)  isosorbide   mononitrate ER Tablet (IMDUR): 30 milliGRAM(s) Oral (05-27 @ 12:24)  metoprolol succinate ER: 25 milliGRAM(s) Oral (05-27 @ 05:42)    Data:    T(C): 36.6  HR: 56  BP: 114/63  RR: 18  SpO2: 97%    -WBC 8.62 / HgB 10.1 / Hct 34.4 / Plt 186  -Na 142 / Cl 110 / BUN 17 / Glucose 77  -K 4.9 / CO2 22 / Cr 0.96  -ALT -- / Alk Phos -- / T.Bili --  -INR 1.15 / PTT 34.7    Radiology: < from: CT Abdomen and Pelvis w/ IV Cont (05.25.22 @ 16:40) >  LIVER: Normal size and contour. Multiple bilobar hypoenhancing masses,   some with areas of cystic degeneration suspicious for metastases.     < end of copied text >      Assessment/Plan: 89M former smoker w/ copd, CAD s/p CABG, hypothyroid presents for evaluation of chronic diarrhea and weight loss found to have imaging concerning for metastatic carcinoid tumor referred to IR for liver lesion biopsy.      - Will plan for Liver biopsy on wed 6/1  - NEED COVID TEST WITHIN 5 DAYS OF PROCEDURE.  - Pt needs to be NPO AMN.  - Can perform as outpt if pt wishes.  - NEEDS STAT AM CBC, BMP, TYPE AND SCREEN and COAGS.  - Case and imaging reviewed with Dr. Bright. Interventional Radiology    Evaluate for Procedure: Liver lesion biopsy    HPI:  89M former smoker w/ copd, CAD s/p CABG, hypothyroid presents for evaluation of chronic diarrhea and weight loss found to have imaging concerning for metastatic carcinoid tumor referred to IR for liver lesion biopsy.     Allergies:   Medications (Abx/Cardiac/Anticoagulation/Blood Products)  aspirin enteric coated: 81 milliGRAM(s) Oral (05-27 @ 12:24)  enoxaparin Injectable: 40 milliGRAM(s) SubCutaneous (05-27 @ 05:42)  isosorbide   mononitrate ER Tablet (IMDUR): 30 milliGRAM(s) Oral (05-27 @ 12:24)  metoprolol succinate ER: 25 milliGRAM(s) Oral (05-27 @ 05:42)    Data:    T(C): 36.6  HR: 56  BP: 114/63  RR: 18  SpO2: 97%    -WBC 8.62 / HgB 10.1 / Hct 34.4 / Plt 186  -Na 142 / Cl 110 / BUN 17 / Glucose 77  -K 4.9 / CO2 22 / Cr 0.96  -ALT -- / Alk Phos -- / T.Bili --  -INR 1.15 / PTT 34.7    Radiology: < from: CT Abdomen and Pelvis w/ IV Cont (05.25.22 @ 16:40) >  LIVER: Normal size and contour. Multiple bilobar hypoenhancing masses,   some with areas of cystic degeneration suspicious for metastases.     < end of copied text >      Assessment/Plan: 89M former smoker w/ copd, CAD s/p CABG, hypothyroid presents for evaluation of chronic diarrhea and weight loss found to have imaging concerning for metastatic carcinoid tumor referred to IR for liver lesion biopsy.      - Will plan for Liver biopsy on wed 6/1.  - Please hold ASA 81mg for 5 days and Lovenox for 24hrs.  - NEED COVID TEST WITHIN 5 DAYS OF PROCEDURE.  - Pt needs to be NPO AMN.  - Can perform as outpt if pt wishes.  - NEEDS STAT AM CBC, BMP, TYPE AND SCREEN and COAGS.  - Case and imaging reviewed with Dr. Bright.  - D/w NP zeta

## 2022-05-28 PROCEDURE — 99232 SBSQ HOSP IP/OBS MODERATE 35: CPT

## 2022-05-28 RX ADMIN — FINASTERIDE 5 MILLIGRAM(S): 5 TABLET, FILM COATED ORAL at 11:55

## 2022-05-28 RX ADMIN — Medication 2 MILLIGRAM(S): at 11:55

## 2022-05-28 RX ADMIN — Medication 88 MICROGRAM(S): at 06:34

## 2022-05-28 RX ADMIN — ISOSORBIDE MONONITRATE 30 MILLIGRAM(S): 60 TABLET, EXTENDED RELEASE ORAL at 11:55

## 2022-05-28 RX ADMIN — Medication 25 MILLIGRAM(S): at 08:16

## 2022-05-28 RX ADMIN — ATORVASTATIN CALCIUM 80 MILLIGRAM(S): 80 TABLET, FILM COATED ORAL at 22:25

## 2022-05-28 RX ADMIN — ENOXAPARIN SODIUM 40 MILLIGRAM(S): 100 INJECTION SUBCUTANEOUS at 06:35

## 2022-05-28 NOTE — PHYSICAL THERAPY INITIAL EVALUATION ADULT - LEVEL OF INDEPENDENCE: SUPINE/SIT, REHAB EVAL
moderate assist (50% patients effort)/maximum assist (25% patients effort) moderate assist (50% patients effort)

## 2022-05-28 NOTE — DIETITIAN INITIAL EVALUATION ADULT - ENERGY INTAKE
Poor (<50%) In-house pt reports fair PO intake. RD observed lunch tray with ~ 50% of entree consumed. Denies any nausea, emesis at this time. Pt started on loperamide for diarrhea.

## 2022-05-28 NOTE — DIETITIAN INITIAL EVALUATION ADULT - PERTINENT MEDS FT
MEDICATIONS  (STANDING):  atorvastatin 80 milliGRAM(s) Oral at bedtime  enoxaparin Injectable 40 milliGRAM(s) SubCutaneous every 24 hours  finasteride 5 milliGRAM(s) Oral daily  isosorbide   mononitrate ER Tablet (IMDUR) 30 milliGRAM(s) Oral daily  levothyroxine 88 MICROGram(s) Oral daily  metoprolol succinate ER 25 milliGRAM(s) Oral daily    MEDICATIONS  (PRN):  loperamide 2 milliGRAM(s) Oral every 8 hours PRN Diarrhea

## 2022-05-28 NOTE — PHYSICAL THERAPY INITIAL EVALUATION ADULT - BED MOBILITY LIMITATIONS, REHAB EVAL
decreased ability to use arms for pushing/pulling/impaired ability to control trunk for mobility decreased ability to use arms for pushing/pulling/decreased ability to use legs for bridging/pushing/impaired ability to control trunk for mobility

## 2022-05-28 NOTE — DIETITIAN INITIAL EVALUATION ADULT - REASON FOR ADMISSION
Failure to thrive in adult    Chart reviewed, events noted. This is a "89M former smoker w/ copd, CAD s/p CABG, hypothyroid presents for evaluation of chronic diarrhea and weight loss found to have imaging concerning for metastatic carcinoid tumor"

## 2022-05-28 NOTE — PHYSICAL THERAPY INITIAL EVALUATION ADULT - PRECAUTIONS/LIMITATIONS, REHAB EVAL
CT head: No acute intracranial hemorrhage, mass effect or midline shift, CT abd/pelvis: Spiculated mesenteric mass suspicious for carcinoid tumor.Multiple bilobar hepatic metastases.Indeterminate lesions of the spleen and left adrenal gland.Upper abdominal and retroperitoneal lymphadenopathy.Suspicion of DVT involving the right common femoral and external iliac veins. Suggest correlation with venous duplex study., 5/25: CT a/p imaging concerning for possible DVT in R common femoral vein.  patient defers to niece regarding starting ac for now 5/27: PT FOR LIVER CORE BX on 6/1. PER IR RECOMMENDATION:/fall precautions

## 2022-05-28 NOTE — DIETITIAN INITIAL EVALUATION ADULT - ORAL INTAKE PTA/DIET HISTORY
Pt seen at bedside, pt [place], limited subjective information obtained. Pt reports decreased appetite at home for a while, states "anything I eat would runs right through me". Pt noted with chronic diarrhea for the last 3-4 months. NKFA per chart. Pt denies chewing/swallowing difficulty, nausea, vomiting, constipation. Lives with older sister.

## 2022-05-28 NOTE — PHYSICAL THERAPY INITIAL EVALUATION ADULT - ADDITIONAL COMMENTS
as per care coordination notes. Pt ambulatory with RW and requires assist with mobility and ADL's. has family to care for pt as needed.

## 2022-05-28 NOTE — DIETITIAN INITIAL EVALUATION ADULT - SIGNS/SYMPTOMS
severe muscle and fat loss, >20% weight x 1 year, meeting </=75% of estimated needs >/=1 month pt with pressure injuries, concern for metastatic carcinoid tumor

## 2022-05-28 NOTE — DIETITIAN INITIAL EVALUATION ADULT - ADD RECOMMEND
1) Continue current diet as tolerated. 2) Recommend addition of Ensure Enlive BID to supplement PO intake (monitor need to adjust). 3) Consider addition of multivitamin and vitamin C supplement. 4) Malnutrition alert placed in EMR.

## 2022-05-28 NOTE — DIETITIAN INITIAL EVALUATION ADULT - PERTINENT LABORATORY DATA
05-27    142  |  110<H>  |  17  ----------------------------<  77  4.9   |  22  |  0.96    Ca    9.0      27 May 2022 07:20  Phos  3.0     05-27  Mg     1.9     05-27

## 2022-05-28 NOTE — PHYSICAL THERAPY INITIAL EVALUATION ADULT - PERTINENT HX OF CURRENT PROBLEM, REHAB EVAL
89 M former smoker w/ copd, CAD s/p CABG, hypothyroid presents for evaluation of chronic diarrhea and weight loss found to have imaging concerning for metastatic carcinoid tumor, Dx: FTT Carcinoid Tumor with mets

## 2022-05-28 NOTE — PROGRESS NOTE ADULT - SUBJECTIVE AND OBJECTIVE BOX
Patient is a 89y old  Male who presents with a chief complaint of 89M p/w chronic diarrhea and weight loss (26 May 2022 17:49)      SUBJECTIVE / OVERNIGHT EVENTS: Patient seen and examined at bedside.   Reports some improvement in diarrhea, but improving.     ROS:  All other review of systems negative    Allergies    No Known Allergies    Intolerances        MEDICATIONS  (STANDING):  aspirin enteric coated 81 milliGRAM(s) Oral daily  atorvastatin 80 milliGRAM(s) Oral at bedtime  enoxaparin Injectable 40 milliGRAM(s) SubCutaneous every 24 hours  finasteride 5 milliGRAM(s) Oral daily  isosorbide   mononitrate ER Tablet (IMDUR) 30 milliGRAM(s) Oral daily  levothyroxine 88 MICROGram(s) Oral daily  metoprolol succinate ER 25 milliGRAM(s) Oral daily    MEDICATIONS  (PRN):  loperamide 2 milliGRAM(s) Oral every 8 hours PRN Diarrhea      Vital Signs Last 24 Hrs  T(C): 36.6 (27 May 2022 12:36), Max: 36.8 (26 May 2022 14:44)  T(F): 97.9 (27 May 2022 12:36), Max: 98.3 (26 May 2022 14:44)  HR: 56 (27 May 2022 12:36) (56 - 64)  BP: 114/63 (27 May 2022 12:36) (100/70 - 114/63)  BP(mean): --  RR: 18 (27 May 2022 12:36) (18 - 18)  SpO2: 97% (27 May 2022 12:36) (95% - 98%)  CAPILLARY BLOOD GLUCOSE        I&O's Summary    26 May 2022 07:  -  27 May 2022 07:00  --------------------------------------------------------  IN: 600 mL / OUT: 0 mL / NET: 600 mL    27 May 2022 07:01  -  27 May 2022 13:25  --------------------------------------------------------  IN: 240 mL / OUT: 0 mL / NET: 240 mL        PHYSICAL EXAM:  GENERAL: thin, elderly, [place]  HEAD:  Atraumatic, Normocephalic, temporal wasting  EYES: EOMI, PERRLA, conjunctiva and sclera clear  NECK: Supple, No JVD  CHEST/LUNG: Clear to auscultation bilaterally; No wheeze  HEART: Regular rate and rhythm; No murmurs, rubs, or gallops  ABDOMEN: Soft, Nontender, Nondistended; Bowel sounds present  EXTREMITIES:  2+ Peripheral Pulses, No clubbing, cyanosis, or edema  NEUROLOGY: AAOx3, non-focal  PSYCH: calm  SKIN: No rashes or lesions    LABS:                        10.1   8.62  )-----------( 186      ( 27 May 2022 07:20 )             34.4     05-    142  |  110<H>  |  17  ----------------------------<  77  4.9   |  22  |  0.96    Ca    9.0      27 May 2022 07:20  Phos  3.0     -  Mg     1.9     -    TPro  5.9<L>  /  Alb  2.8<L>  /  TBili  0.5  /  DBili  x   /  AST  11  /  ALT  11  /  AlkPhos  107  05-    PT/INR - ( 26 May 2022 06:09 )   PT: 13.4 sec;   INR: 1.15 ratio         PTT - ( 26 May 2022 06:09 )  PTT:34.7 sec      Urinalysis Basic - ( 25 May 2022 16:32 )    Color: Yellow / Appearance: Clear / S.025 / pH: x  Gluc: x / Ketone: Negative  / Bili: Small / Urobili: Negative   Blood: x / Protein: 100 mg/dL / Nitrite: Negative   Leuk Esterase: Negative / RBC: 4 /hpf / WBC 6 /HPF   Sq Epi: x / Non Sq Epi: 5 /hpf / Bacteria: Negative        RADIOLOGY & ADDITIONAL TESTS:    Consultant(s) Notes Reviewed:  Wound care and Oncology rec noted     Care Discussed with Consultants/Other Providers: Medicine ACP

## 2022-05-28 NOTE — DIETITIAN INITIAL EVALUATION ADULT - OTHER INFO
Weight: pt reports he used to weigh ~ 218lbs a few years ago, endorses significant weight loss of~ 60lbs over the last year or so. Current dosing weight is 149.6lbs.

## 2022-05-28 NOTE — DIETITIAN INITIAL EVALUATION ADULT - NSFNSPHYEXAMSKINFT_GEN_A_CORE
skin: "Sacral/bilateral Buttocks deep tissue injury present on admission"-- per wound care note 5/26

## 2022-05-29 LAB
ANION GAP SERPL CALC-SCNC: 11 MMOL/L — SIGNIFICANT CHANGE UP (ref 5–17)
BUN SERPL-MCNC: 20 MG/DL — SIGNIFICANT CHANGE UP (ref 7–23)
CALCIUM SERPL-MCNC: 9.2 MG/DL — SIGNIFICANT CHANGE UP (ref 8.4–10.5)
CHLORIDE SERPL-SCNC: 110 MMOL/L — HIGH (ref 96–108)
CO2 SERPL-SCNC: 23 MMOL/L — SIGNIFICANT CHANGE UP (ref 22–31)
CREAT SERPL-MCNC: 0.99 MG/DL — SIGNIFICANT CHANGE UP (ref 0.5–1.3)
CULTURE RESULTS: SIGNIFICANT CHANGE UP
EGFR: 71 ML/MIN/1.73M2 — SIGNIFICANT CHANGE UP
GLUCOSE SERPL-MCNC: 73 MG/DL — SIGNIFICANT CHANGE UP (ref 70–99)
HCT VFR BLD CALC: 40.7 % — SIGNIFICANT CHANGE UP (ref 39–50)
HGB BLD-MCNC: 12.2 G/DL — LOW (ref 13–17)
MCHC RBC-ENTMCNC: 26.9 PG — LOW (ref 27–34)
MCHC RBC-ENTMCNC: 30 GM/DL — LOW (ref 32–36)
MCV RBC AUTO: 89.8 FL — SIGNIFICANT CHANGE UP (ref 80–100)
NRBC # BLD: 0 /100 WBCS — SIGNIFICANT CHANGE UP (ref 0–0)
PLATELET # BLD AUTO: 192 K/UL — SIGNIFICANT CHANGE UP (ref 150–400)
POTASSIUM SERPL-MCNC: 4.8 MMOL/L — SIGNIFICANT CHANGE UP (ref 3.5–5.3)
POTASSIUM SERPL-SCNC: 4.8 MMOL/L — SIGNIFICANT CHANGE UP (ref 3.5–5.3)
RBC # BLD: 4.53 M/UL — SIGNIFICANT CHANGE UP (ref 4.2–5.8)
RBC # FLD: 16.4 % — HIGH (ref 10.3–14.5)
SODIUM SERPL-SCNC: 144 MMOL/L — SIGNIFICANT CHANGE UP (ref 135–145)
SPECIMEN SOURCE: SIGNIFICANT CHANGE UP
WBC # BLD: 10.81 K/UL — HIGH (ref 3.8–10.5)
WBC # FLD AUTO: 10.81 K/UL — HIGH (ref 3.8–10.5)

## 2022-05-29 PROCEDURE — 99232 SBSQ HOSP IP/OBS MODERATE 35: CPT

## 2022-05-29 RX ORDER — LOPERAMIDE HCL 2 MG
2 TABLET ORAL EVERY 8 HOURS
Refills: 0 | Status: DISCONTINUED | OUTPATIENT
Start: 2022-05-29 | End: 2022-06-10

## 2022-05-29 RX ADMIN — Medication 25 MILLIGRAM(S): at 05:37

## 2022-05-29 RX ADMIN — ATORVASTATIN CALCIUM 80 MILLIGRAM(S): 80 TABLET, FILM COATED ORAL at 21:39

## 2022-05-29 RX ADMIN — Medication 88 MICROGRAM(S): at 05:37

## 2022-05-29 RX ADMIN — ISOSORBIDE MONONITRATE 30 MILLIGRAM(S): 60 TABLET, EXTENDED RELEASE ORAL at 13:20

## 2022-05-29 RX ADMIN — ENOXAPARIN SODIUM 40 MILLIGRAM(S): 100 INJECTION SUBCUTANEOUS at 06:23

## 2022-05-29 RX ADMIN — FINASTERIDE 5 MILLIGRAM(S): 5 TABLET, FILM COATED ORAL at 13:20

## 2022-05-29 RX ADMIN — Medication 2 MILLIGRAM(S): at 21:39

## 2022-05-29 RX ADMIN — Medication 2 MILLIGRAM(S): at 13:21

## 2022-05-29 NOTE — SWALLOW BEDSIDE ASSESSMENT ADULT - COMMENTS
Continued history:  He reports seeking medical attention at a nearby hospital but cannot recall the name ([place] vs [facility]?) and was told that he had spots on his liver and that he would need to follow up with his pcp. He has been experiencing fatigue, loss of appetite and dry cough as well. He reports being concerned he may have cancer and would want to know if anything could be done but if chances of remission are limited then he would not want aggressive care and would want to be "let go."  -CT scan with spiculated mesenteric mass suspicious for carcinoid tumor; multiple bilobar hepatic metastases. CT chest with non-specific pulm nodules  -Recommend IR biopsy of liver lesion; of note patient had FNA bx of liver lesion at [facility] that was inconclusive.   -IR consult placed. Planned for 6/1  -Oncology consulted     CT Chest : 5/25 : Left apical 0.3 cm nodule, left upper lobe 0.7 cm ground-glass nodule, and clustered micronodules at the periphery of the right base;   indeterminate, but may be infectious/inflammatory in etiology. CT chest  follow-up in 3 months recommended to assess stability. Small right pleural effusion and trace loculated left pleural effusion.

## 2022-05-29 NOTE — PROGRESS NOTE ADULT - SUBJECTIVE AND OBJECTIVE BOX
Patient is a 89y old  Male who presents with a chief complaint of 89M p/w chronic diarrhea and weight loss (26 May 2022 17:49)      SUBJECTIVE / OVERNIGHT EVENTS: Patient seen and examined at bedside.   Continues to have diarrhea. Loperamide started as standing.     ROS:  All other review of systems negative    Allergies    No Known Allergies    Intolerances        MEDICATIONS  (STANDING):  aspirin enteric coated 81 milliGRAM(s) Oral daily  atorvastatin 80 milliGRAM(s) Oral at bedtime  enoxaparin Injectable 40 milliGRAM(s) SubCutaneous every 24 hours  finasteride 5 milliGRAM(s) Oral daily  isosorbide   mononitrate ER Tablet (IMDUR) 30 milliGRAM(s) Oral daily  levothyroxine 88 MICROGram(s) Oral daily  metoprolol succinate ER 25 milliGRAM(s) Oral daily    MEDICATIONS  (PRN):  loperamide 2 milliGRAM(s) Oral every 8 hours PRN Diarrhea      Vital Signs Last 24 Hrs  T(C): 36.6 (27 May 2022 12:36), Max: 36.8 (26 May 2022 14:44)  T(F): 97.9 (27 May 2022 12:36), Max: 98.3 (26 May 2022 14:44)  HR: 56 (27 May 2022 12:36) (56 - 64)  BP: 114/63 (27 May 2022 12:36) (100/70 - 114/63)  BP(mean): --  RR: 18 (27 May 2022 12:36) (18 - 18)  SpO2: 97% (27 May 2022 12:36) (95% - 98%)  CAPILLARY BLOOD GLUCOSE        I&O's Summary    26 May 2022 07:01  -  27 May 2022 07:00  --------------------------------------------------------  IN: 600 mL / OUT: 0 mL / NET: 600 mL    27 May 2022 07:01  -  27 May 2022 13:25  --------------------------------------------------------  IN: 240 mL / OUT: 0 mL / NET: 240 mL        PHYSICAL EXAM:  GENERAL: thin, elderly, [place]  HEAD:  Atraumatic, Normocephalic, temporal wasting  EYES: EOMI, PERRLA, conjunctiva and sclera clear  NECK: Supple, No JVD  CHEST/LUNG: Clear to auscultation bilaterally; No wheeze  HEART: Regular rate and rhythm; No murmurs, rubs, or gallops  ABDOMEN: Soft, Nontender, Nondistended; Bowel sounds present  EXTREMITIES:  2+ Peripheral Pulses, No clubbing, cyanosis, or edema  NEUROLOGY: AAOx3, non-focal  PSYCH: calm  SKIN: No rashes or lesions    LABS:                        10.1   8.62  )-----------( 186      ( 27 May 2022 07:20 )             34.4     05-    142  |  110<H>  |  17  ----------------------------<  77  4.9   |  22  |  0.96    Ca    9.0      27 May 2022 07:20  Phos  3.0     -  Mg     1.9         TPro  5.9<L>  /  Alb  2.8<L>  /  TBili  0.5  /  DBili  x   /  AST  11  /  ALT  11  /  AlkPhos  107  05-    PT/INR - ( 26 May 2022 06:09 )   PT: 13.4 sec;   INR: 1.15 ratio         PTT - ( 26 May 2022 06:09 )  PTT:34.7 sec      Urinalysis Basic - ( 25 May 2022 16:32 )    Color: Yellow / Appearance: Clear / S.025 / pH: x  Gluc: x / Ketone: Negative  / Bili: Small / Urobili: Negative   Blood: x / Protein: 100 mg/dL / Nitrite: Negative   Leuk Esterase: Negative / RBC: 4 /hpf / WBC 6 /HPF   Sq Epi: x / Non Sq Epi: 5 /hpf / Bacteria: Negative        RADIOLOGY & ADDITIONAL TESTS:    Consultant(s) Notes Reviewed:  Wound care and Oncology rec noted     Care Discussed with Consultants/Other Providers: Medicine ACP

## 2022-05-29 NOTE — SWALLOW BEDSIDE ASSESSMENT ADULT - ORAL PHASE
prolonged mastication however eventually effective/functional, no oral pocketing or holding. suspected reduced control of less viscous textures

## 2022-05-29 NOTE — SWALLOW BEDSIDE ASSESSMENT ADULT - SWALLOW EVAL: RECOMMENDED DIET
Mildly thick liquids and regular solids; Encourage additional [facility] d/w pt regarding modification of textures to ensure aligns with his wishes

## 2022-05-29 NOTE — SWALLOW BEDSIDE ASSESSMENT ADULT - SLP PERTINENT HISTORY OF CURRENT PROBLEM
JOSE MANCERA is a 89 year old  Male PMHx; former smoker w/ copd, CAD s/p CABG, hypothyroid who presents with a chief complaint of chronic diarrhea and weight loss; found to have imaging concerning for metastatic carcinoid tumor. The patient reports that over the course of 1 year he has lost over 60lb and over the last 3-4mo he has experienced nonbloody watery diarrhea.

## 2022-05-29 NOTE — SWALLOW BEDSIDE ASSESSMENT ADULT - SWALLOW EVAL: FEEDING ASSISTANCE
assist as needed , PRN/set up only required Principal Discharge DX:	Subchorionic hemorrhage  Goal:	Outpatient monitoring  Assessment and plan of treatment:	Patient should transition to regular activity level. Resume regular diet. Patient should be on pelvic rest. Patient should follow up with MFM on 5/1 and her GI on 5/9. Patient should call her doctor sooner if she develops a fever or uncontrolled vaginal bleeding.

## 2022-05-29 NOTE — SWALLOW BEDSIDE ASSESSMENT ADULT - SWALLOW EVAL: RECOMMENDED FEEDING/EATING TECHNIQUES
meds with applesauce not liquids/allow for swallow between intakes/alternate food with liquid/no straws/oral hygiene/position upright (90 degrees)/small sips/bites

## 2022-05-29 NOTE — SWALLOW BEDSIDE ASSESSMENT ADULT - SWALLOW EVAL: DIAGNOSIS
Pt p/w a chief complaint of chronic diarrhea and weight loss; found to have imaging concerning for metastatic carcinoid tumor. Oropharyngeal swallow skills p/w deficits in presence of age/gender and deconditioned state. Of note, COPD/baseline cough negatively impacting optimal testing at bedside. Appreciated immediate cough response, differing from baseline cough, with thin liquids and notable improvement with nectar/mildly thick liquids. Can NOT r/o silent aspiration at the bedside. IF pt wishes to further pursue swallow status would recommend completion of MBS during hospital course to better guide recommended textures and allow patient to make a  more informed decision.

## 2022-05-29 NOTE — SWALLOW BEDSIDE ASSESSMENT ADULT - NS SPL SWALLOW CLINIC TRIAL FT
Did not demonstrate immediate coughing w/ regular solids or w/ mildly thick liquids. Can not r/o silent aspiration at bedside. Strongly recommend discussion for patients wishes for diet modification and instrumentation going forward; briefly discussed at bedside. Did not indicate distaste for thick liquids during this evaluation.

## 2022-05-29 NOTE — SWALLOW BEDSIDE ASSESSMENT ADULT - PHARYNGEAL PHASE
w/ thin liquids, subsequent immediate cough response, appearing to be auditorily different from baseline coughing; increase in wet quality. improvement w/ nectar/mildly thick liquids./Delayed pharyngeal swallow/Multiple swallows

## 2022-05-29 NOTE — SWALLOW BEDSIDE ASSESSMENT ADULT - SLP GENERAL OBSERVATIONS
Encountered pt in bed, following breakfast, pt requesting to watch a "war movie". NAD. RA. Baseline congested coughing, uncertain if related to recent meal vs baseline cough in presence of COPD. Pt complaint of poor appetite. He denied difficulty swallowing however endorsed frequent coughing w/ meals/deglutition.

## 2022-05-30 LAB
HCT VFR BLD CALC: 39.5 % — SIGNIFICANT CHANGE UP (ref 39–50)
HGB BLD-MCNC: 11.7 G/DL — LOW (ref 13–17)
MCHC RBC-ENTMCNC: 26.7 PG — LOW (ref 27–34)
MCHC RBC-ENTMCNC: 29.6 GM/DL — LOW (ref 32–36)
MCV RBC AUTO: 90.2 FL — SIGNIFICANT CHANGE UP (ref 80–100)
NRBC # BLD: 0 /100 WBCS — SIGNIFICANT CHANGE UP (ref 0–0)
PLATELET # BLD AUTO: 172 K/UL — SIGNIFICANT CHANGE UP (ref 150–400)
RBC # BLD: 4.38 M/UL — SIGNIFICANT CHANGE UP (ref 4.2–5.8)
RBC # FLD: 16.2 % — HIGH (ref 10.3–14.5)
SARS-COV-2 RNA SPEC QL NAA+PROBE: SIGNIFICANT CHANGE UP
WBC # BLD: 9.14 K/UL — SIGNIFICANT CHANGE UP (ref 3.8–10.5)
WBC # FLD AUTO: 9.14 K/UL — SIGNIFICANT CHANGE UP (ref 3.8–10.5)

## 2022-05-30 PROCEDURE — 99233 SBSQ HOSP IP/OBS HIGH 50: CPT

## 2022-05-30 RX ORDER — ACETAMINOPHEN 500 MG
650 TABLET ORAL ONCE
Refills: 0 | Status: COMPLETED | OUTPATIENT
Start: 2022-05-30 | End: 2022-05-30

## 2022-05-30 RX ADMIN — ENOXAPARIN SODIUM 40 MILLIGRAM(S): 100 INJECTION SUBCUTANEOUS at 05:46

## 2022-05-30 RX ADMIN — Medication 650 MILLIGRAM(S): at 05:14

## 2022-05-30 RX ADMIN — ATORVASTATIN CALCIUM 80 MILLIGRAM(S): 80 TABLET, FILM COATED ORAL at 22:51

## 2022-05-30 RX ADMIN — Medication 25 MILLIGRAM(S): at 12:55

## 2022-05-30 RX ADMIN — Medication 88 MICROGRAM(S): at 05:15

## 2022-05-30 RX ADMIN — Medication 2 MILLIGRAM(S): at 12:55

## 2022-05-30 RX ADMIN — FINASTERIDE 5 MILLIGRAM(S): 5 TABLET, FILM COATED ORAL at 12:55

## 2022-05-30 RX ADMIN — Medication 650 MILLIGRAM(S): at 06:00

## 2022-05-30 RX ADMIN — Medication 2 MILLIGRAM(S): at 22:51

## 2022-05-30 NOTE — PROGRESS NOTE ADULT - PROBLEM SELECTOR PLAN 8
lovenox for dvt at ppx on hold for biopsy 6/1 (last dose 5/30) lovenox for dvt at ppx on hold for biopsy 6/1 (last dose 5/30)    Dispo: PT eval pending

## 2022-05-30 NOTE — PROGRESS NOTE ADULT - SUBJECTIVE AND OBJECTIVE BOX
Patient is a 89y old  Male who presents with a chief complaint of 89M p/w chronic diarrhea and weight loss (29 May 2022 10:15)      SUBJECTIVE / OVERNIGHT EVENTS: Patient seen and examined at bedside. States he feels weak, continues to have loose stools, he is in agreement with liver biopsy 6/1    ROS:  All other review of systems negative    Allergies    No Known Allergies    Intolerances        MEDICATIONS  (STANDING):  atorvastatin 80 milliGRAM(s) Oral at bedtime  enoxaparin Injectable 40 milliGRAM(s) SubCutaneous every 24 hours  finasteride 5 milliGRAM(s) Oral daily  isosorbide   mononitrate ER Tablet (IMDUR) 30 milliGRAM(s) Oral daily  levothyroxine 88 MICROGram(s) Oral daily  loperamide 2 milliGRAM(s) Oral every 8 hours  metoprolol succinate ER 25 milliGRAM(s) Oral daily    MEDICATIONS  (PRN):      Vital Signs Last 24 Hrs  T(C): 36.6 (30 May 2022 11:59), Max: 36.6 (29 May 2022 12:33)  T(F): 97.8 (30 May 2022 11:59), Max: 97.9 (29 May 2022 19:17)  HR: 76 (30 May 2022 11:59) (73 - 79)  BP: 104/66 (30 May 2022 11:59) (95/58 - 104/66)  BP(mean): --  RR: 18 (30 May 2022 11:59) (18 - 18)  SpO2: 95% (30 May 2022 11:59) (93% - 96%)  CAPILLARY BLOOD GLUCOSE        I&O's Summary    29 May 2022 07:01  -  30 May 2022 07:00  --------------------------------------------------------  IN: 480 mL / OUT: 0 mL / NET: 480 mL    30 May 2022 07:01  -  30 May 2022 12:17  --------------------------------------------------------  IN: 120 mL / OUT: 0 mL / NET: 120 mL        PHYSICAL EXAM:  GENERAL: elderly, cachectic   HEAD:  Atraumatic, Normocephalic  EYES: EOMI, PERRLA, conjunctiva and sclera clear  NECK: Supple, No JVD  CHEST/LUNG: Clear to auscultation bilaterally; No wheeze  HEART: Regular rate and rhythm; No murmurs, rubs, or gallops  ABDOMEN: Soft, Nontender, Nondistended; Bowel sounds present  EXTREMITIES:  2+ Peripheral Pulses, No clubbing, cyanosis, or edema  NEUROLOGY: AAOx3, non-focal  PSYCH: calm  SKIN: No rashes or lesions    LABS:                        12.2   10.81 )-----------( 192      ( 29 May 2022 09:07 )             40.7     05-29    144  |  110<H>  |  20  ----------------------------<  73  4.8   |  23  |  0.99    Ca    9.2      29 May 2022 09:07                RADIOLOGY & ADDITIONAL TESTS:    Consultant(s) Notes Reviewed:  SLP rec noted    Care Discussed with Consultants/Other Providers: Medicine ACP

## 2022-05-31 LAB
ANION GAP SERPL CALC-SCNC: 8 MMOL/L — SIGNIFICANT CHANGE UP (ref 5–17)
APTT BLD: 35.2 SEC — SIGNIFICANT CHANGE UP (ref 27.5–35.5)
BLD GP AB SCN SERPL QL: NEGATIVE — SIGNIFICANT CHANGE UP
BUN SERPL-MCNC: 26 MG/DL — HIGH (ref 7–23)
CALCIUM SERPL-MCNC: 9.6 MG/DL — SIGNIFICANT CHANGE UP (ref 8.4–10.5)
CHLORIDE SERPL-SCNC: 108 MMOL/L — SIGNIFICANT CHANGE UP (ref 96–108)
CO2 SERPL-SCNC: 25 MMOL/L — SIGNIFICANT CHANGE UP (ref 22–31)
CREAT SERPL-MCNC: 0.99 MG/DL — SIGNIFICANT CHANGE UP (ref 0.5–1.3)
EGFR: 71 ML/MIN/1.73M2 — SIGNIFICANT CHANGE UP
GLUCOSE SERPL-MCNC: 81 MG/DL — SIGNIFICANT CHANGE UP (ref 70–99)
HCT VFR BLD CALC: 40.2 % — SIGNIFICANT CHANGE UP (ref 39–50)
HGB BLD-MCNC: 12.2 G/DL — LOW (ref 13–17)
INR BLD: 1.21 RATIO — HIGH (ref 0.88–1.16)
MCHC RBC-ENTMCNC: 27.4 PG — SIGNIFICANT CHANGE UP (ref 27–34)
MCHC RBC-ENTMCNC: 30.3 GM/DL — LOW (ref 32–36)
MCV RBC AUTO: 90.3 FL — SIGNIFICANT CHANGE UP (ref 80–100)
NRBC # BLD: 0 /100 WBCS — SIGNIFICANT CHANGE UP (ref 0–0)
PLATELET # BLD AUTO: 199 K/UL — SIGNIFICANT CHANGE UP (ref 150–400)
POTASSIUM SERPL-MCNC: 4.8 MMOL/L — SIGNIFICANT CHANGE UP (ref 3.5–5.3)
POTASSIUM SERPL-SCNC: 4.8 MMOL/L — SIGNIFICANT CHANGE UP (ref 3.5–5.3)
PROTHROM AB SERPL-ACNC: 13.9 SEC — HIGH (ref 10.5–13.4)
RBC # BLD: 4.45 M/UL — SIGNIFICANT CHANGE UP (ref 4.2–5.8)
RBC # FLD: 16.2 % — HIGH (ref 10.3–14.5)
RH IG SCN BLD-IMP: POSITIVE — SIGNIFICANT CHANGE UP
SODIUM SERPL-SCNC: 141 MMOL/L — SIGNIFICANT CHANGE UP (ref 135–145)
WBC # BLD: 9.18 K/UL — SIGNIFICANT CHANGE UP (ref 3.8–10.5)
WBC # FLD AUTO: 9.18 K/UL — SIGNIFICANT CHANGE UP (ref 3.8–10.5)

## 2022-05-31 PROCEDURE — 99233 SBSQ HOSP IP/OBS HIGH 50: CPT

## 2022-05-31 PROCEDURE — 99231 SBSQ HOSP IP/OBS SF/LOW 25: CPT

## 2022-05-31 RX ADMIN — Medication 2 MILLIGRAM(S): at 21:31

## 2022-05-31 RX ADMIN — FINASTERIDE 5 MILLIGRAM(S): 5 TABLET, FILM COATED ORAL at 13:14

## 2022-05-31 RX ADMIN — Medication 2 MILLIGRAM(S): at 06:17

## 2022-05-31 RX ADMIN — Medication 25 MILLIGRAM(S): at 06:19

## 2022-05-31 RX ADMIN — Medication 88 MICROGRAM(S): at 06:17

## 2022-05-31 RX ADMIN — ATORVASTATIN CALCIUM 80 MILLIGRAM(S): 80 TABLET, FILM COATED ORAL at 21:31

## 2022-05-31 RX ADMIN — Medication 2 MILLIGRAM(S): at 13:14

## 2022-05-31 RX ADMIN — ISOSORBIDE MONONITRATE 30 MILLIGRAM(S): 60 TABLET, EXTENDED RELEASE ORAL at 13:14

## 2022-05-31 NOTE — PROGRESS NOTE ADULT - SUBJECTIVE AND OBJECTIVE BOX
Interventional Radiology pre-op note    HPI: 89M former smoker w/ copd, CAD s/p CABG, hypothyroid presents for evaluation of chronic diarrhea and weight loss found to have imaging concerning for metastatic carcinoid tumor referred to IR for liver lesion biopsy.       Procedure: Liver biopsy    Patient seen and examined at bedside. Patient is hard of hearing. A&Ox3, knowledge was assessed via the teach back method.                           12.2   9.18  )-----------( 199      ( 31 May 2022 07:01 )             40.2     05-31    141  |  108  |  26<H>  ----------------------------<  81  4.8   |  25  |  0.99    Ca    9.6      31 May 2022 07:01      PT/INR - ( 31 May 2022 07:02 )   PT: 13.9 sec;   INR: 1.21 ratio         PTT - ( 31 May 2022 07:02 )  PTT:35.2 sec  COVID-19 PCR: NotDetec (05-30-22 @ 18:32)      Assessment & Plan: 89M former smoker w/ copd, CAD s/p CABG, hypothyroid presents for evaluation of chronic diarrhea and weight loss found to have imaging concerning for metastatic carcinoid tumor referred to IR for liver lesion biopsy.     -Will plan for liver biopsy tomorrow 6/1/22  -Please keep patient NPO after midnight tonight  -Continue to hold anticoagulation   -STAT labs in the am  -Maintain active T&S x2  -COVID PCR within 5 days of procedure (5/30)  -Informed consent obtained from patient. DNR status addressed and per pt's request DNR was suspended for the perioperative period. Patient verbalizes understanding that DNR will be reinstated upon returning to the floor.       Please contact IR with any questions/ concerns regarding above plan at [phone].   Also available on teams.

## 2022-05-31 NOTE — PROGRESS NOTE ADULT - SUBJECTIVE AND OBJECTIVE BOX
Patient is a 89y old  Male who presents with a chief complaint of 89M p/w chronic diarrhea and weight loss (30 May 2022 12:17)      SUBJECTIVE / OVERNIGHT EVENTS: Patient seen and examined at bedside. He denies any CP, SOB, abd pain and n/v. He is waiting anxiously for the Biospy tomorrow     ROS:  All other review of systems negative    Allergies    No Known Allergies    Intolerances        MEDICATIONS  (STANDING):  atorvastatin 80 milliGRAM(s) Oral at bedtime  finasteride 5 milliGRAM(s) Oral daily  isosorbide   mononitrate ER Tablet (IMDUR) 30 milliGRAM(s) Oral daily  levothyroxine 88 MICROGram(s) Oral daily  loperamide 2 milliGRAM(s) Oral every 8 hours  metoprolol succinate ER 25 milliGRAM(s) Oral daily    MEDICATIONS  (PRN):      Vital Signs Last 24 Hrs  T(C): 37 (31 May 2022 12:02), Max: 37 (31 May 2022 12:02)  T(F): 98.6 (31 May 2022 12:02), Max: 98.6 (31 May 2022 12:02)  HR: 60 (31 May 2022 12:02) (60 - 66)  BP: 94/64 (31 May 2022 12:02) (94/57 - 101/67)  BP(mean): --  RR: 18 (31 May 2022 12:02) (17 - 18)  SpO2: 95% (31 May 2022 12:02) (95% - 96%)  CAPILLARY BLOOD GLUCOSE        I&O's Summary    30 May 2022 07:01  -  31 May 2022 07:00  --------------------------------------------------------  IN: 740 mL / OUT: 0 mL / NET: 740 mL    31 May 2022 07:01  -  31 May 2022 12:19  --------------------------------------------------------  IN: 120 mL / OUT: 0 mL / NET: 120 mL        PHYSICAL EXAM:  GENERAL: elderly, cachectic   HEAD:  Atraumatic, Normocephalic, + [place]  EYES: EOMI, PERRLA, conjunctiva and sclera clear  NECK: Supple, No JVD  CHEST/LUNG: Clear to auscultation bilaterally; No wheeze  HEART: Regular rate and rhythm; No murmurs, rubs, or gallops  ABDOMEN: Soft, Nontender, Nondistended; Bowel sounds present  EXTREMITIES:  2+ Peripheral Pulses, No clubbing, cyanosis, or edema  NEUROLOGY: AAOx3, non-focal  PSYCH: calm  SKIN: No rashes or lesions    LABS:                        12.2   9.18  )-----------( 199      ( 31 May 2022 07:01 )             40.2     05-31    141  |  108  |  26<H>  ----------------------------<  81  4.8   |  25  |  0.99    Ca    9.6      31 May 2022 07:01      PT/INR - ( 31 May 2022 07:02 )   PT: 13.9 sec;   INR: 1.21 ratio         PTT - ( 31 May 2022 07:02 )  PTT:35.2 sec          RADIOLOGY & ADDITIONAL TESTS:    Care Discussed with Consultants/Other Providers: Medicine ACP

## 2022-06-01 ENCOUNTER — RESULT REVIEW (OUTPATIENT)
Age: 87
End: 2022-06-01

## 2022-06-01 LAB
ANION GAP SERPL CALC-SCNC: 10 MMOL/L — SIGNIFICANT CHANGE UP (ref 5–17)
BLD GP AB SCN SERPL QL: NEGATIVE — SIGNIFICANT CHANGE UP
BUN SERPL-MCNC: 28 MG/DL — HIGH (ref 7–23)
CALCIUM SERPL-MCNC: 9 MG/DL — SIGNIFICANT CHANGE UP (ref 8.4–10.5)
CHLORIDE SERPL-SCNC: 107 MMOL/L — SIGNIFICANT CHANGE UP (ref 96–108)
CO2 SERPL-SCNC: 22 MMOL/L — SIGNIFICANT CHANGE UP (ref 22–31)
CREAT SERPL-MCNC: 0.96 MG/DL — SIGNIFICANT CHANGE UP (ref 0.5–1.3)
EGFR: 73 ML/MIN/1.73M2 — SIGNIFICANT CHANGE UP
GLUCOSE SERPL-MCNC: 71 MG/DL — SIGNIFICANT CHANGE UP (ref 70–99)
HCT VFR BLD CALC: 37.4 % — LOW (ref 39–50)
HGB BLD-MCNC: 11.3 G/DL — LOW (ref 13–17)
INR BLD: 1.24 RATIO — HIGH (ref 0.88–1.16)
MCHC RBC-ENTMCNC: 26.8 PG — LOW (ref 27–34)
MCHC RBC-ENTMCNC: 30.2 GM/DL — LOW (ref 32–36)
MCV RBC AUTO: 88.8 FL — SIGNIFICANT CHANGE UP (ref 80–100)
NRBC # BLD: 0 /100 WBCS — SIGNIFICANT CHANGE UP (ref 0–0)
PLATELET # BLD AUTO: 203 K/UL — SIGNIFICANT CHANGE UP (ref 150–400)
POTASSIUM SERPL-MCNC: 4.5 MMOL/L — SIGNIFICANT CHANGE UP (ref 3.5–5.3)
POTASSIUM SERPL-SCNC: 4.5 MMOL/L — SIGNIFICANT CHANGE UP (ref 3.5–5.3)
PROTHROM AB SERPL-ACNC: 14.4 SEC — HIGH (ref 10.5–13.4)
RBC # BLD: 4.21 M/UL — SIGNIFICANT CHANGE UP (ref 4.2–5.8)
RBC # FLD: 16.1 % — HIGH (ref 10.3–14.5)
RH IG SCN BLD-IMP: POSITIVE — SIGNIFICANT CHANGE UP
SODIUM SERPL-SCNC: 139 MMOL/L — SIGNIFICANT CHANGE UP (ref 135–145)
WBC # BLD: 8.96 K/UL — SIGNIFICANT CHANGE UP (ref 3.8–10.5)
WBC # FLD AUTO: 8.96 K/UL — SIGNIFICANT CHANGE UP (ref 3.8–10.5)

## 2022-06-01 PROCEDURE — 88173 CYTOPATH EVAL FNA REPORT: CPT | Mod: 26

## 2022-06-01 PROCEDURE — 88360 TUMOR IMMUNOHISTOCHEM/MANUAL: CPT | Mod: 26,59

## 2022-06-01 PROCEDURE — 47000 NEEDLE BIOPSY OF LIVER PERQ: CPT

## 2022-06-01 PROCEDURE — 76942 ECHO GUIDE FOR BIOPSY: CPT | Mod: 26

## 2022-06-01 PROCEDURE — 99233 SBSQ HOSP IP/OBS HIGH 50: CPT

## 2022-06-01 PROCEDURE — 88307 TISSUE EXAM BY PATHOLOGIST: CPT | Mod: 26

## 2022-06-01 PROCEDURE — 88342 IMHCHEM/IMCYTCHM 1ST ANTB: CPT | Mod: 26,59

## 2022-06-01 PROCEDURE — 88341 IMHCHEM/IMCYTCHM EA ADD ANTB: CPT | Mod: 26,59

## 2022-06-01 RX ADMIN — Medication 88 MICROGRAM(S): at 07:33

## 2022-06-01 RX ADMIN — Medication 2 MILLIGRAM(S): at 07:33

## 2022-06-01 RX ADMIN — FINASTERIDE 5 MILLIGRAM(S): 5 TABLET, FILM COATED ORAL at 13:59

## 2022-06-01 RX ADMIN — ISOSORBIDE MONONITRATE 30 MILLIGRAM(S): 60 TABLET, EXTENDED RELEASE ORAL at 13:59

## 2022-06-01 RX ADMIN — ATORVASTATIN CALCIUM 80 MILLIGRAM(S): 80 TABLET, FILM COATED ORAL at 21:15

## 2022-06-01 RX ADMIN — Medication 25 MILLIGRAM(S): at 07:33

## 2022-06-01 RX ADMIN — Medication 2 MILLIGRAM(S): at 21:14

## 2022-06-01 NOTE — PROGRESS NOTE ADULT - SUBJECTIVE AND OBJECTIVE BOX
Patient is a 89y old  Male who presents with a chief complaint of 89M p/w chronic diarrhea and weight loss (31 May 2022 18:12)      SUBJECTIVE / OVERNIGHT EVENTS: Patient seen and examined at bedside. States that he feels ok, is happy that liver biopsy is elmer for today 6/1. No acute events overnight     ROS:  All other review of systems negative    Allergies    No Known Allergies    Intolerances        MEDICATIONS  (STANDING):  atorvastatin 80 milliGRAM(s) Oral at bedtime  finasteride 5 milliGRAM(s) Oral daily  isosorbide   mononitrate ER Tablet (IMDUR) 30 milliGRAM(s) Oral daily  levothyroxine 88 MICROGram(s) Oral daily  loperamide 2 milliGRAM(s) Oral every 8 hours  metoprolol succinate ER 25 milliGRAM(s) Oral daily    MEDICATIONS  (PRN):      Vital Signs Last 24 Hrs  T(C): 36 (01 Jun 2022 11:40), Max: 37 (31 May 2022 12:02)  T(F): 96.8 (01 Jun 2022 11:40), Max: 98.6 (31 May 2022 12:02)  HR: 63 (01 Jun 2022 11:40) (60 - 72)  BP: 112/76 (01 Jun 2022 11:40) (94/64 - 112/76)  BP(mean): 84 (01 Jun 2022 11:40) (84 - 84)  RR: 21 (01 Jun 2022 11:40) (17 - 21)  SpO2: 99% (01 Jun 2022 11:40) (94% - 99%)  CAPILLARY BLOOD GLUCOSE        I&O's Summary    31 May 2022 07:01  -  01 Jun 2022 07:00  --------------------------------------------------------  IN: 590 mL / OUT: 0 mL / NET: 590 mL        PHYSICAL EXAM:  GENERAL: elderly, cachectic   HEAD:  Atraumatic, Normocephalic, + [place]  EYES: EOMI, PERRLA, conjunctiva and sclera clear  NECK: Supple, No JVD  CHEST/LUNG: Clear to auscultation bilaterally; No wheeze  HEART: Regular rate and rhythm; No murmurs, rubs, or gallops  ABDOMEN: Soft, Nontender, Nondistended; Bowel sounds present  EXTREMITIES:  2+ Peripheral Pulses, No clubbing, cyanosis, or edema  NEUROLOGY: AAOx3, non-focal  PSYCH: calm  SKIN: No rashes or lesions    LABS:                        11.3   8.96  )-----------( 203      ( 01 Jun 2022 10:09 )             37.4     06-01    139  |  107  |  28<H>  ----------------------------<  71  4.5   |  22  |  0.96    Ca    9.0      01 Jun 2022 10:09      PT/INR - ( 01 Jun 2022 10:09 )   PT: 14.4 sec;   INR: 1.24 ratio         PTT - ( 31 May 2022 07:02 )  PTT:35.2 sec          RADIOLOGY & ADDITIONAL TESTS:    Consultant(s) Notes Reviewed:  IR rec noted     Care Discussed with Consultants/Other Providers: Medicine ACP

## 2022-06-01 NOTE — PRE PROCEDURE NOTE - PRE PROCEDURE EVALUATION
------------------------------------------------------------  Interventional Radiology Pre-Procedure Note  ------------------------------------------------------------    Indication: 89y Male with pmh of copd, CAD s/p CABG, hypothyroid presents for evaluation of chronic diarrhea and weight loss found to have imaging concerning for metastatic carcinoid tumor referred to IR for liver lesion biopsy.    Past Medical History:  COPD, mild    Hypothyroid    CAD in native artery        Allergies: No Known Allergies      Medications:    isosorbide   mononitrate ER Tablet (IMDUR): 30 milliGRAM(s) Oral (05-31-22 @ 13:14)  metoprolol succinate ER: 25 milliGRAM(s) Oral (06-01-22 @ 07:33)      Vital Signs:   T(F): 97.8 (04:46), Max: 98.6 (12:02)  HR: 72 (04:46)  BP: 102/67 (04:46)  RR: 17 (04:46)  SpO2: 96% (04:46)    Labs:           11.3  8.96)-----(203     (06-01-22 @ 10:09)         37.4     139 | 107 | 28  --------------------< 71     (06-01-22 @ 10:09)  4.5 | 22 | 0.96       PT: 14.4<H> 06-01-22 @ 10:09  aPTT: -- 06-01-22 @ 10:09   INR: 1.24<H> 06-01-22 @ 10:09    Imaging:   Multiple liver lesions concerning to be metastatic.     Consent:  Informed consent obtained. DNR status was addressed and per pt's request DNR was suspended for the perioperative period. Patient verbalized understanding that DNR will be reinstated upon returning to the floor at time of consent.     Procedure Plan:   Plan for liver lesion biopsy  today.   5

## 2022-06-01 NOTE — PROCEDURE NOTE - PROCEDURE FINDINGS AND DETAILS
Vascular & Interventional Radiology Post-Procedure Note    Pre-Procedure Diagnosis: Liver lesions   Post-Procedure Diagnosis: Same as pre.  : Dr. Bright  Assistant(s): Dr. Carbajal    Procedure Details:   Successful ultrasound guided liver lesion biopsy with cores given to cytopathologist present.     Complications: None  Estimated Blood Loss: Minimal  Specimen: None  Contrast: None  Sedation: Per anesthesia  Patient Condition/Disposition: Stable, IRS, Floor    Anticoagulation management:  - Aspirin 81 mg and prophylactic Lovenox may be resumed in 24 hours post procedure.     Plan:  - Successful liver lesion biopsy.

## 2022-06-02 LAB
ALBUMIN SERPL ELPH-MCNC: 3.2 G/DL — LOW (ref 3.3–5)
ALP SERPL-CCNC: 407 U/L — HIGH (ref 40–120)
ALT FLD-CCNC: 30 U/L — SIGNIFICANT CHANGE UP (ref 10–45)
ANION GAP SERPL CALC-SCNC: 13 MMOL/L — SIGNIFICANT CHANGE UP (ref 5–17)
AST SERPL-CCNC: 32 U/L — SIGNIFICANT CHANGE UP (ref 10–40)
BILIRUB SERPL-MCNC: 1 MG/DL — SIGNIFICANT CHANGE UP (ref 0.2–1.2)
BUN SERPL-MCNC: 33 MG/DL — HIGH (ref 7–23)
CALCIUM SERPL-MCNC: 10.5 MG/DL — SIGNIFICANT CHANGE UP (ref 8.4–10.5)
CHLORIDE SERPL-SCNC: 104 MMOL/L — SIGNIFICANT CHANGE UP (ref 96–108)
CO2 SERPL-SCNC: 24 MMOL/L — SIGNIFICANT CHANGE UP (ref 22–31)
CREAT SERPL-MCNC: 1.15 MG/DL — SIGNIFICANT CHANGE UP (ref 0.5–1.3)
EGFR: 59 ML/MIN/1.73M2 — LOW
GLUCOSE SERPL-MCNC: 129 MG/DL — HIGH (ref 70–99)
HCT VFR BLD CALC: 48.1 % — SIGNIFICANT CHANGE UP (ref 39–50)
HGB BLD-MCNC: 14.5 G/DL — SIGNIFICANT CHANGE UP (ref 13–17)
MCHC RBC-ENTMCNC: 26.9 PG — LOW (ref 27–34)
MCHC RBC-ENTMCNC: 30.1 GM/DL — LOW (ref 32–36)
MCV RBC AUTO: 89.2 FL — SIGNIFICANT CHANGE UP (ref 80–100)
NRBC # BLD: 0 /100 WBCS — SIGNIFICANT CHANGE UP (ref 0–0)
PLATELET # BLD AUTO: 268 K/UL — SIGNIFICANT CHANGE UP (ref 150–400)
POTASSIUM SERPL-MCNC: 5.2 MMOL/L — SIGNIFICANT CHANGE UP (ref 3.5–5.3)
POTASSIUM SERPL-SCNC: 5.2 MMOL/L — SIGNIFICANT CHANGE UP (ref 3.5–5.3)
PROT SERPL-MCNC: 7.7 G/DL — SIGNIFICANT CHANGE UP (ref 6–8.3)
RBC # BLD: 5.39 M/UL — SIGNIFICANT CHANGE UP (ref 4.2–5.8)
RBC # FLD: 16.4 % — HIGH (ref 10.3–14.5)
SARS-COV-2 RNA SPEC QL NAA+PROBE: SIGNIFICANT CHANGE UP
SODIUM SERPL-SCNC: 141 MMOL/L — SIGNIFICANT CHANGE UP (ref 135–145)
WBC # BLD: 21.98 K/UL — HIGH (ref 3.8–10.5)
WBC # FLD AUTO: 21.98 K/UL — HIGH (ref 3.8–10.5)

## 2022-06-02 PROCEDURE — 99231 SBSQ HOSP IP/OBS SF/LOW 25: CPT

## 2022-06-02 PROCEDURE — 99233 SBSQ HOSP IP/OBS HIGH 50: CPT

## 2022-06-02 PROCEDURE — 71045 X-RAY EXAM CHEST 1 VIEW: CPT | Mod: 26

## 2022-06-02 RX ORDER — SODIUM CHLORIDE 9 MG/ML
1000 INJECTION, SOLUTION INTRAVENOUS
Refills: 0 | Status: DISCONTINUED | OUTPATIENT
Start: 2022-06-02 | End: 2022-06-03

## 2022-06-02 RX ORDER — ASPIRIN/CALCIUM CARB/MAGNESIUM 324 MG
81 TABLET ORAL DAILY
Refills: 0 | Status: DISCONTINUED | OUTPATIENT
Start: 2022-06-02 | End: 2022-06-10

## 2022-06-02 RX ORDER — APIXABAN 2.5 MG/1
5 TABLET, FILM COATED ORAL EVERY 12 HOURS
Refills: 0 | Status: DISCONTINUED | OUTPATIENT
Start: 2022-06-03 | End: 2022-06-09

## 2022-06-02 RX ORDER — ENOXAPARIN SODIUM 100 MG/ML
40 INJECTION SUBCUTANEOUS EVERY 24 HOURS
Refills: 0 | Status: DISCONTINUED | OUTPATIENT
Start: 2022-06-02 | End: 2022-06-02

## 2022-06-02 RX ADMIN — ISOSORBIDE MONONITRATE 30 MILLIGRAM(S): 60 TABLET, EXTENDED RELEASE ORAL at 14:13

## 2022-06-02 RX ADMIN — Medication 88 MICROGRAM(S): at 05:30

## 2022-06-02 RX ADMIN — SODIUM CHLORIDE 75 MILLILITER(S): 9 INJECTION, SOLUTION INTRAVENOUS at 15:08

## 2022-06-02 RX ADMIN — Medication 81 MILLIGRAM(S): at 14:23

## 2022-06-02 RX ADMIN — Medication 2 MILLIGRAM(S): at 05:29

## 2022-06-02 RX ADMIN — FINASTERIDE 5 MILLIGRAM(S): 5 TABLET, FILM COATED ORAL at 14:13

## 2022-06-02 RX ADMIN — ATORVASTATIN CALCIUM 80 MILLIGRAM(S): 80 TABLET, FILM COATED ORAL at 21:58

## 2022-06-02 RX ADMIN — Medication 2 MILLIGRAM(S): at 21:58

## 2022-06-02 NOTE — PROGRESS NOTE ADULT - PROBLEM SELECTOR PLAN 2
patient defers to niece regarding starting ac for now  CT a/p imaging concerning for possible DVT in R common femoral vein  d/w patient and niece Nel, started on Eliquis 5 mg BID (low dose per family wishes) risk and benefits of AC were explained in detail

## 2022-06-02 NOTE — PROGRESS NOTE ADULT - SUBJECTIVE AND OBJECTIVE BOX
Interventional Radiology Follow-Up Note.     Patient seen and examined @ bedside around 6:45AM    This is a 89y Male s/p Liver lesion biopsy on 6/1 in Interventional Radiology with Dr. Bright.            Medication:     isosorbide   mononitrate ER Tablet (IMDUR): (06-01)  metoprolol succinate ER: (06-01)    Vitals:   T(F): 98, Max: 98 (04:56)  HR: 91  BP: 99/69  RR: 18  SpO2: 94%    Physical Exam:  General: Nontoxic, in NAD.  Abdomen: soft, NTND. Dressing removed. Site  c/d/i.    .     LABS:  WBC 8.96 / Hgb 11.3 / Hct 37.4 / Plt 203  Na 139 / K 4.5 / CO2 22 / Cl 107 / BUN 28 / Cr 0.96 / Glucose 71  ALT -- / AST -- / Alk Phos -- / Tbili --  Ptt -- / Pt 14.4 / INR 1.24      Assessment/Plan:   89y Male with pmh of copd, CAD s/p CABG, hypothyroid presents for evaluation of chronic diarrhea and weight loss found to have imaging concerning for metastatic carcinoid tumor s/p liver lesion biopsy with Dr. Bright.    - May restart ASA and Lovenox DVT PPX today.  - Pathology pending .   - VSS.  - IR will sign off.         Please call IR  [phone] with any questions, concerns, or issues regarding above.    Also available on Teams.

## 2022-06-02 NOTE — PROGRESS NOTE ADULT - SUBJECTIVE AND OBJECTIVE BOX
Patient is a 89y old  Male who presents with a chief complaint of 89M p/w chronic diarrhea and weight loss (02 Jun 2022 07:28)      SUBJECTIVE / OVERNIGHT EVENTS:    ROS:  All other review of systems negative    Allergies    No Known Allergies    Intolerances        MEDICATIONS  (STANDING):  aspirin enteric coated 81 milliGRAM(s) Oral daily  atorvastatin 80 milliGRAM(s) Oral at bedtime  finasteride 5 milliGRAM(s) Oral daily  isosorbide   mononitrate ER Tablet (IMDUR) 30 milliGRAM(s) Oral daily  lactated ringers. 1000 milliLiter(s) (75 mL/Hr) IV Continuous <Continuous>  levothyroxine 88 MICROGram(s) Oral daily  loperamide 2 milliGRAM(s) Oral every 8 hours  metoprolol succinate ER 25 milliGRAM(s) Oral daily    MEDICATIONS  (PRN):      Vital Signs Last 24 Hrs  T(C): 36.8 (02 Jun 2022 12:30), Max: 36.8 (02 Jun 2022 12:30)  T(F): 98.2 (02 Jun 2022 12:30), Max: 98.2 (02 Jun 2022 12:30)  HR: 85 (02 Jun 2022 12:30) (72 - 91)  BP: 103/71 (02 Jun 2022 12:30) (99/69 - 114/75)  BP(mean): --  RR: 18 (02 Jun 2022 12:30) (18 - 18)  SpO2: 94% (02 Jun 2022 12:30) (92% - 94%)  CAPILLARY BLOOD GLUCOSE        I&O's Summary    01 Jun 2022 07:01  -  02 Jun 2022 07:00  --------------------------------------------------------  IN: 340 mL / OUT: 0 mL / NET: 340 mL    02 Jun 2022 07:01  -  02 Jun 2022 13:12  --------------------------------------------------------  IN: 120 mL / OUT: 0 mL / NET: 120 mL        PHYSICAL EXAM:  GENERAL: NAD, well-developed  HEAD:  Atraumatic, Normocephalic  EYES: EOMI, PERRLA, conjunctiva and sclera clear  NECK: Supple, No JVD  CHEST/LUNG: Clear to auscultation bilaterally; No wheeze  HEART: Regular rate and rhythm; No murmurs, rubs, or gallops  ABDOMEN: Soft, Nontender, Nondistended; Bowel sounds present  EXTREMITIES:  2+ Peripheral Pulses, No clubbing, cyanosis, or edema  NEUROLOGY: AAOx3, non-focal  PSYCH: calm  SKIN: No rashes or lesions    LABS:                        14.5   21.98 )-----------( 268      ( 02 Jun 2022 12:01 )             48.1     06-02    141  |  104  |  33<H>  ----------------------------<  129<H>  5.2   |  24  |  1.15    Ca    10.5      02 Jun 2022 12:01    TPro  7.7  /  Alb  3.2<L>  /  TBili  1.0  /  DBili  x   /  AST  32  /  ALT  30  /  AlkPhos  407<H>  06-02    PT/INR - ( 01 Jun 2022 10:09 )   PT: 14.4 sec;   INR: 1.24 ratio                   RADIOLOGY & ADDITIONAL TESTS:    Consultant(s) Notes Reviewed:  IR rec noted     Care Discussed with Consultants/Other Providers: Medicine ACP and CM    Case Discussed with niece over the phone

## 2022-06-03 LAB
HCT VFR BLD CALC: 40.8 % — SIGNIFICANT CHANGE UP (ref 39–50)
HGB BLD-MCNC: 12.5 G/DL — LOW (ref 13–17)
MCHC RBC-ENTMCNC: 27.1 PG — SIGNIFICANT CHANGE UP (ref 27–34)
MCHC RBC-ENTMCNC: 30.6 GM/DL — LOW (ref 32–36)
MCV RBC AUTO: 88.3 FL — SIGNIFICANT CHANGE UP (ref 80–100)
NRBC # BLD: 0 /100 WBCS — SIGNIFICANT CHANGE UP (ref 0–0)
PLATELET # BLD AUTO: 231 K/UL — SIGNIFICANT CHANGE UP (ref 150–400)
RBC # BLD: 4.62 M/UL — SIGNIFICANT CHANGE UP (ref 4.2–5.8)
RBC # FLD: 16.2 % — HIGH (ref 10.3–14.5)
WBC # BLD: 14.29 K/UL — HIGH (ref 3.8–10.5)
WBC # FLD AUTO: 14.29 K/UL — HIGH (ref 3.8–10.5)

## 2022-06-03 PROCEDURE — 99232 SBSQ HOSP IP/OBS MODERATE 35: CPT

## 2022-06-03 RX ORDER — SODIUM CHLORIDE 9 MG/ML
1000 INJECTION, SOLUTION INTRAVENOUS
Refills: 0 | Status: DISCONTINUED | OUTPATIENT
Start: 2022-06-03 | End: 2022-06-05

## 2022-06-03 RX ADMIN — SODIUM CHLORIDE 75 MILLILITER(S): 9 INJECTION, SOLUTION INTRAVENOUS at 10:47

## 2022-06-03 RX ADMIN — ATORVASTATIN CALCIUM 80 MILLIGRAM(S): 80 TABLET, FILM COATED ORAL at 21:22

## 2022-06-03 RX ADMIN — APIXABAN 5 MILLIGRAM(S): 2.5 TABLET, FILM COATED ORAL at 06:21

## 2022-06-03 RX ADMIN — FINASTERIDE 5 MILLIGRAM(S): 5 TABLET, FILM COATED ORAL at 13:19

## 2022-06-03 RX ADMIN — Medication 88 MICROGRAM(S): at 06:21

## 2022-06-03 RX ADMIN — APIXABAN 5 MILLIGRAM(S): 2.5 TABLET, FILM COATED ORAL at 16:53

## 2022-06-03 RX ADMIN — Medication 81 MILLIGRAM(S): at 13:19

## 2022-06-03 NOTE — PROGRESS NOTE ADULT - SUBJECTIVE AND OBJECTIVE BOX
Patient is a 89y old  Male who presents with a chief complaint of 89M p/w chronic diarrhea and weight loss (02 Jun 2022 13:12)      SUBJECTIVE / OVERNIGHT EVENTS: Patient seen and examined at bedside. States he feels ok, denies any CP, SOB, abd pain and n/v. still not drinking thickened liquids     ROS:  All other review of systems negative    Allergies    No Known Allergies    Intolerances        MEDICATIONS  (STANDING):  apixaban 5 milliGRAM(s) Oral every 12 hours  aspirin enteric coated 81 milliGRAM(s) Oral daily  atorvastatin 80 milliGRAM(s) Oral at bedtime  finasteride 5 milliGRAM(s) Oral daily  isosorbide   mononitrate ER Tablet (IMDUR) 30 milliGRAM(s) Oral daily  lactated ringers. 1000 milliLiter(s) (75 mL/Hr) IV Continuous <Continuous>  levothyroxine 88 MICROGram(s) Oral daily  loperamide 2 milliGRAM(s) Oral every 8 hours  metoprolol succinate ER 25 milliGRAM(s) Oral daily    MEDICATIONS  (PRN):      Vital Signs Last 24 Hrs  T(C): 36.2 (03 Jun 2022 04:41), Max: 36.8 (02 Jun 2022 12:30)  T(F): 97.2 (03 Jun 2022 04:41), Max: 98.2 (02 Jun 2022 12:30)  HR: 77 (03 Jun 2022 04:41) (77 - 88)  BP: 90/60 (03 Jun 2022 04:41) (90/60 - 104/74)  BP(mean): --  RR: 18 (03 Jun 2022 04:41) (18 - 18)  SpO2: 94% (03 Jun 2022 04:41) (94% - 94%)  CAPILLARY BLOOD GLUCOSE        I&O's Summary    02 Jun 2022 07:01  -  03 Jun 2022 07:00  --------------------------------------------------------  IN: 940 mL / OUT: 0 mL / NET: 940 mL    03 Jun 2022 07:01  -  03 Jun 2022 11:48  --------------------------------------------------------  IN: 120 mL / OUT: 0 mL / NET: 120 mL        PHYSICAL EXAM:  GENERAL: elderly, cachectic   HEAD:  Atraumatic, Normocephalic, + [place]  EYES: EOMI, PERRLA, conjunctiva and sclera clear  NECK: Supple, No JVD  CHEST/LUNG: Clear to auscultation bilaterally; No wheeze  HEART: Regular rate and rhythm; No murmurs, rubs, or gallops  ABDOMEN: Soft, Nontender, Nondistended; Bowel sounds present  EXTREMITIES:  2+ Peripheral Pulses, No clubbing, cyanosis, or edema  NEUROLOGY: AAOx3, non-focal  PSYCH: calm  SKIN: No rashes or lesions  LABS:                        12.5   14.29 )-----------( 231      ( 03 Jun 2022 07:20 )             40.8     06-02    141  |  104  |  33<H>  ----------------------------<  129<H>  5.2   |  24  |  1.15    Ca    10.5      02 Jun 2022 12:01    TPro  7.7  /  Alb  3.2<L>  /  TBili  1.0  /  DBili  x   /  AST  32  /  ALT  30  /  AlkPhos  407<H>  06-02              RADIOLOGY & ADDITIONAL TESTS:    Consultant(s) Notes Reviewed:  heme/onc rec noted     Care Discussed with Consultants/Other Providers: Medicine ACP    unable to reach patient's niece Nel today over the phone, however she spoke with  about d/c planning

## 2022-06-03 NOTE — CHART NOTE - NSCHARTNOTEFT_GEN_A_CORE
89M former smoker w/ copd, CAD s/p CABG, hypothyroid presents for evaluation of chronic diarrhea and weight loss found to have imaging concerning for metastatic carcinoid tumor.    Concern for Carcinoid   -CT scan with spiculated mesenteric mass suspicious for carcinoid tumor; multiple bilobar hepatic metastases. CT chest with non-specific pulm nodules  -Overall carcinoid  tumors are slowly growing tumors that tend to have a good 5 year prognosis even with mets. For local/ regional mets five year survival is >90%  -Discussed the above with patient's niece Nel who is on board with proceeding with biopsy. Explained that there is no guarantee that this is carcinoid; so will have to wait for path before moving forward  -recommend IR biopsy of liver lesion; of note patient had FNA bx of liver lesion at [facility] that was inconclusive. S/p IR biopsy on 5/1  -patient pending d/c to rehab. No plans for chemo at rehab. Patient can follow at [facility] after bx results obtained/after discharged from rehab for further assessment/management

## 2022-06-04 ENCOUNTER — TRANSCRIPTION ENCOUNTER (OUTPATIENT)
Age: 87
End: 2022-06-04

## 2022-06-04 LAB
ALBUMIN SERPL ELPH-MCNC: 2.7 G/DL — LOW (ref 3.3–5)
ALP SERPL-CCNC: 300 U/L — HIGH (ref 40–120)
ALT FLD-CCNC: 19 U/L — SIGNIFICANT CHANGE UP (ref 10–45)
ANION GAP SERPL CALC-SCNC: 10 MMOL/L — SIGNIFICANT CHANGE UP (ref 5–17)
AST SERPL-CCNC: 19 U/L — SIGNIFICANT CHANGE UP (ref 10–40)
BASOPHILS # BLD AUTO: 0.02 K/UL — SIGNIFICANT CHANGE UP (ref 0–0.2)
BASOPHILS NFR BLD AUTO: 0.2 % — SIGNIFICANT CHANGE UP (ref 0–2)
BILIRUB SERPL-MCNC: 1 MG/DL — SIGNIFICANT CHANGE UP (ref 0.2–1.2)
BUN SERPL-MCNC: 44 MG/DL — HIGH (ref 7–23)
CALCIUM SERPL-MCNC: 10 MG/DL — SIGNIFICANT CHANGE UP (ref 8.4–10.5)
CHLORIDE SERPL-SCNC: 104 MMOL/L — SIGNIFICANT CHANGE UP (ref 96–108)
CO2 SERPL-SCNC: 25 MMOL/L — SIGNIFICANT CHANGE UP (ref 22–31)
CREAT SERPL-MCNC: 1.21 MG/DL — SIGNIFICANT CHANGE UP (ref 0.5–1.3)
EGFR: 55 ML/MIN/1.73M2 — LOW
EOSINOPHIL # BLD AUTO: 0.02 K/UL — SIGNIFICANT CHANGE UP (ref 0–0.5)
EOSINOPHIL NFR BLD AUTO: 0.2 % — SIGNIFICANT CHANGE UP (ref 0–6)
GLUCOSE SERPL-MCNC: 70 MG/DL — SIGNIFICANT CHANGE UP (ref 70–99)
HCT VFR BLD CALC: 40.4 % — SIGNIFICANT CHANGE UP (ref 39–50)
HGB BLD-MCNC: 12.2 G/DL — LOW (ref 13–17)
IMM GRANULOCYTES NFR BLD AUTO: 1 % — SIGNIFICANT CHANGE UP (ref 0–1.5)
LYMPHOCYTES # BLD AUTO: 0.8 K/UL — LOW (ref 1–3.3)
LYMPHOCYTES # BLD AUTO: 6.4 % — LOW (ref 13–44)
MCHC RBC-ENTMCNC: 26.9 PG — LOW (ref 27–34)
MCHC RBC-ENTMCNC: 30.2 GM/DL — LOW (ref 32–36)
MCV RBC AUTO: 89.2 FL — SIGNIFICANT CHANGE UP (ref 80–100)
MONOCYTES # BLD AUTO: 1.3 K/UL — HIGH (ref 0–0.9)
MONOCYTES NFR BLD AUTO: 10.4 % — SIGNIFICANT CHANGE UP (ref 2–14)
NEUTROPHILS # BLD AUTO: 10.28 K/UL — HIGH (ref 1.8–7.4)
NEUTROPHILS NFR BLD AUTO: 81.8 % — HIGH (ref 43–77)
NRBC # BLD: 0 /100 WBCS — SIGNIFICANT CHANGE UP (ref 0–0)
PLATELET # BLD AUTO: 214 K/UL — SIGNIFICANT CHANGE UP (ref 150–400)
POTASSIUM SERPL-MCNC: 4.1 MMOL/L — SIGNIFICANT CHANGE UP (ref 3.5–5.3)
POTASSIUM SERPL-SCNC: 4.1 MMOL/L — SIGNIFICANT CHANGE UP (ref 3.5–5.3)
PROT SERPL-MCNC: 6.3 G/DL — SIGNIFICANT CHANGE UP (ref 6–8.3)
RBC # BLD: 4.53 M/UL — SIGNIFICANT CHANGE UP (ref 4.2–5.8)
RBC # FLD: 16 % — HIGH (ref 10.3–14.5)
SODIUM SERPL-SCNC: 139 MMOL/L — SIGNIFICANT CHANGE UP (ref 135–145)
WBC # BLD: 12.55 K/UL — HIGH (ref 3.8–10.5)
WBC # FLD AUTO: 12.55 K/UL — HIGH (ref 3.8–10.5)

## 2022-06-04 PROCEDURE — 99233 SBSQ HOSP IP/OBS HIGH 50: CPT

## 2022-06-04 PROCEDURE — 71045 X-RAY EXAM CHEST 1 VIEW: CPT | Mod: 26

## 2022-06-04 PROCEDURE — 71250 CT THORAX DX C-: CPT | Mod: 26

## 2022-06-04 RX ORDER — APIXABAN 2.5 MG/1
1 TABLET, FILM COATED ORAL
Qty: 0 | Refills: 0 | DISCHARGE
Start: 2022-06-04

## 2022-06-04 RX ORDER — PIPERACILLIN AND TAZOBACTAM 4; .5 G/20ML; G/20ML
3.38 INJECTION, POWDER, LYOPHILIZED, FOR SOLUTION INTRAVENOUS EVERY 8 HOURS
Refills: 0 | Status: DISCONTINUED | OUTPATIENT
Start: 2022-06-04 | End: 2022-06-10

## 2022-06-04 RX ORDER — PIPERACILLIN AND TAZOBACTAM 4; .5 G/20ML; G/20ML
3.38 INJECTION, POWDER, LYOPHILIZED, FOR SOLUTION INTRAVENOUS ONCE
Refills: 0 | Status: COMPLETED | OUTPATIENT
Start: 2022-06-04 | End: 2022-06-04

## 2022-06-04 RX ADMIN — FINASTERIDE 5 MILLIGRAM(S): 5 TABLET, FILM COATED ORAL at 12:08

## 2022-06-04 RX ADMIN — PIPERACILLIN AND TAZOBACTAM 25 GRAM(S): 4; .5 INJECTION, POWDER, LYOPHILIZED, FOR SOLUTION INTRAVENOUS at 20:22

## 2022-06-04 RX ADMIN — APIXABAN 5 MILLIGRAM(S): 2.5 TABLET, FILM COATED ORAL at 17:38

## 2022-06-04 RX ADMIN — PIPERACILLIN AND TAZOBACTAM 200 GRAM(S): 4; .5 INJECTION, POWDER, LYOPHILIZED, FOR SOLUTION INTRAVENOUS at 16:37

## 2022-06-04 RX ADMIN — APIXABAN 5 MILLIGRAM(S): 2.5 TABLET, FILM COATED ORAL at 05:16

## 2022-06-04 RX ADMIN — Medication 2 MILLIGRAM(S): at 21:53

## 2022-06-04 RX ADMIN — Medication 88 MICROGRAM(S): at 05:16

## 2022-06-04 RX ADMIN — Medication 81 MILLIGRAM(S): at 12:08

## 2022-06-04 RX ADMIN — ATORVASTATIN CALCIUM 80 MILLIGRAM(S): 80 TABLET, FILM COATED ORAL at 21:53

## 2022-06-04 NOTE — DISCHARGE NOTE PROVIDER - CARE PROVIDER_API CALL
KASSIDY ROSALES  [facility]  [address] [place] [address]  Phone: [phone]  Fax: [phone]  Follow Up Time: 1 week    Aliya Seals (DO)  HematologyOncology; Internal Medicine  [address] [place] [address]  Phone: [phone]  Fax: [phone]  Follow Up Time: 2 weeks

## 2022-06-04 NOTE — DISCHARGE NOTE PROVIDER - NSDCCPCAREPLAN_GEN_ALL_CORE_FT
PRINCIPAL DISCHARGE DIAGNOSIS  Diagnosis: Adult failure to thrive  Assessment and Plan of Treatment: Eat meals that are rich in protein and calories.  Nutritional supplementation is very important to help provide adequate protein and energy intake.  A prescription for Ensure 3 times a day has been provided for you.  Drinking dietary supplements between meals rather than with meals may be more effective in increasing energy consumption.  Continue Megace.   Drink plenty of fluids to avoid dehydration.        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 Please follow at [facility] after bx results   obtained/after discharged from rehab for further assessment/management.  In order to receive pathology report, please follow up with Heme/onc. Dr. Seals.       Diagnosis: Hypothyroid  Assessment and Plan of Treatment: you do not make enough thyroid hormone  signs & symptoms of low levels of thyroid hormone - tired, getting cold easily, coarse or thin hair, constipation, shortness of breath, swelling, irregular periods  your doctor will do thyroid hormone blood tests at least once a year to monitor if medication dose is adequate  take your thyroid medicine as directed by your doctor & on empty stomach      Diagnosis: Emphysema lung  Assessment and Plan of Treatment: Call your Health Care provider upon arrival home to make a follow up appointment within one week.  Take all inhalers as prescribed by your Health Care Provider.  Take steroids as prescribed by your Health Care Provider.  If your cough increases infrequency and severity and/or you have shortness of breath or increased shortness of breath call your Health Care Provider.  If you develop fever, chills, night sweats, malaise, and/or change in mental status call your Health care Provider.  Nutrition is very important.  Eat small frequent meals.  Increase your activity as tolerated.  Do not stay in bed all day      Diagnosis: DVT, lower extremity  Assessment and Plan of Treatment: Take your "blood thinners" as prescribed.  Walking is encouraged, increase activity as tolerated.  If you develop new leg pain, swelling, and/or redness contact your healthcare provider.  If you develop new chest pain with difficulty breathing, a rapid heart rate and/or a feeling of passing out call emergency medical services 911.       PRINCIPAL DISCHARGE DIAGNOSIS  Diagnosis: Adult failure to thrive  Assessment and Plan of Treatment: Eat meals that are rich in protein and calories.  Nutritional supplementation is very important to help provide adequate protein and energy intake.  A prescription for Ensure 3 times a day has been provided for you.  Drinking dietary supplements between meals rather than with meals may be more effective in increasing energy consumption.  Continue Megace.   Drink plenty of fluids to avoid dehydration.        SECONDARY DISCHARGE DIAGNOSES  Diagnosis: DVT, lower extremity  Assessment and Plan of Treatment: Take your "blood thinners" as prescribed.  Walking is encouraged, increase activity as tolerated.  If you develop new leg pain, swelling, and/or redness contact your healthcare provider.  If you develop new chest pain with difficulty breathing, a rapid heart rate and/or a feeling of passing out call emergency medical services 911.      Diagnosis: Carcinoid tumor  Assessment and Plan of Treatment: ·  Plan: imaging concerning for met carcinoid  -  s/p liver biopsy 6/1 f/u results   No plans for chemo at rehab. Please follow at [facility] after bx results   obtained/after discharged from rehab for further assessment/management.  Continue Loperamide as needed.  In order to receive pathology report, please follow up with Heme/onc. Dr. Seals.       Diagnosis: Hypothyroid  Assessment and Plan of Treatment: you do not make enough thyroid hormone  signs & symptoms of low levels of thyroid hormone - tired, getting cold easily, coarse or thin hair, constipation, shortness of breath, swelling, irregular periods  your doctor will do thyroid hormone blood tests at least once a year to monitor if medication dose is adequate  take your thyroid medicine as directed by your doctor & on empty stomach      Diagnosis: Emphysema lung  Assessment and Plan of Treatment: Call your Health Care provider upon arrival home to make a follow up appointment within one week.  Take all inhalers as prescribed by your Health Care Provider.  Take steroids as prescribed by your Health Care Provider.  If your cough increases infrequency and severity and/or you have shortness of breath or increased shortness of breath call your Health Care Provider.  If you develop fever, chills, night sweats, malaise, and/or change in mental status call your Health care Provider.  Nutrition is very important.  Eat small frequent meals.  Increase your activity as tolerated.  Do not stay in bed all day.

## 2022-06-04 NOTE — PROGRESS NOTE ADULT - SUBJECTIVE AND OBJECTIVE BOX
Patient is a 89y old  Male who presents with a chief complaint of 89M p/w chronic diarrhea and weight loss (03 Jun 2022 11:47)      SUBJECTIVE / OVERNIGHT EVENTS:  Patient seen and examined at bedside. Mono of hearing   Stated he feels ok but not drinking thick liquid, No  CP, SOB, or SOB abd pain and n/v.       MEDICATIONS  (STANDING):  apixaban 5 milliGRAM(s) Oral every 12 hours  aspirin enteric coated 81 milliGRAM(s) Oral daily  atorvastatin 80 milliGRAM(s) Oral at bedtime  finasteride 5 milliGRAM(s) Oral daily  lactated ringers. 1000 milliLiter(s) (75 mL/Hr) IV Continuous <Continuous>  levothyroxine 88 MICROGram(s) Oral daily  loperamide 2 milliGRAM(s) Oral every 8 hours    MEDICATIONS  (PRN):      Vital Signs Last 24 Hrs  T(C): 36.9 (04 Jun 2022 12:03), Max: 36.9 (04 Jun 2022 12:03)  T(F): 98.4 (04 Jun 2022 12:03), Max: 98.4 (04 Jun 2022 12:03)  HR: 83 (04 Jun 2022 12:03) (68 - 100)  BP: 111/73 (04 Jun 2022 12:03) (91/55 - 111/73)  BP(mean): --  RR: 16 (04 Jun 2022 12:03) (16 - 18)  SpO2: 94% (04 Jun 2022 12:03) (93% - 94%)  CAPILLARY BLOOD GLUCOSE        I&O's Summary    03 Jun 2022 07:01  -  04 Jun 2022 07:00  --------------------------------------------------------  IN: 570 mL / OUT: 0 mL / NET: 570 mL    04 Jun 2022 07:01  -  04 Jun 2022 12:34  --------------------------------------------------------  IN: 120 mL / OUT: 0 mL / NET: 120 mL        PHYSICAL EXAM:  GENERAL: NAD, Difficulty hearing  HEAD:  Atraumatic, Normocephalic  EYES: Pink conjunctiva and anicteric sclera   NECK: Supple,   CHEST/LUNG: + crackles/ transmitted breath sound  at bases and more on right side otherwise good air entry at bases  HEART: S1 and  s2 + R No murmurs, rubs, or gallops  ABDOMEN: Soft, Nontender, Nondistended; Bowel sounds present  EXTREMITIES:  2+ Peripheral Pulses, No clubbing, cyanosis, or edema  PSYCH: Awake Alert follows command calm  NEUROLOGY: limited exam but grossly no FND + Hearing loss  SKIN: No rashes    LABS:                        12.2   12.55 )-----------( 214      ( 04 Jun 2022 08:10 )             40.4     06-04    139  |  104  |  44<H>  ----------------------------<  70  4.1   |  25  |  1.21    Ca    10.0      04 Jun 2022 08:03    TPro  6.3  /  Alb  2.7<L>  /  TBili  1.0  /  DBili  x   /  AST  19  /  ALT  19  /  AlkPhos  300<H>  06-04              RADIOLOGY & ADDITIONAL TESTS:    Imaging Personally Reviewed:    Consultant(s) Notes Reviewed:      Care Discussed with Consultants/Other Providers:

## 2022-06-04 NOTE — DISCHARGE NOTE PROVIDER - PROVIDER TOKENS
PROVIDER:[TOKEN:[[id number]],FOLLOWUP:[1 week]],PROVIDER:[TOKEN:[[id number]],FOLLOWUP:[2 weeks]]

## 2022-06-04 NOTE — PROGRESS NOTE ADULT - PROBLEM SELECTOR PLAN 2
patient had deferred to niece regarding starting ac for now  CT a/p imaging concerning for possible DVT in R common femoral vein  Started on Eliquis 5 mg BID (low dose per family wishes) risk and benefits of AC were explained in detail to the family

## 2022-06-04 NOTE — DISCHARGE NOTE PROVIDER - DETAILS OF MALNUTRITION DIAGNOSIS/DIAGNOSES
This patient has been assessed with a concern for Malnutrition and was treated during this hospitalization for the following Nutrition diagnosis/diagnoses:     -  05/28/2022: Severe protein-calorie malnutrition

## 2022-06-04 NOTE — DISCHARGE NOTE PROVIDER - CARE PROVIDERS DIRECT ADDRESSES
,DirectAddress_Unknown,[email or web address]

## 2022-06-04 NOTE — CHART NOTE - NSCHARTNOTEFT_GEN_A_CORE
Patient with cough, with crackles upon physical exam. CXR ordered without significant change from yesterday.  With high suspicion for PNA, CT Chest ordered - which confirmed new PNA.   DC cancelled. BCx ordered x 2. IV Zosyn started as per Dr. Gar.  HD stable.  HCP, Nel made aware of the clinical finding and plan of care.

## 2022-06-04 NOTE — DISCHARGE NOTE PROVIDER - NSDCMRMEDTOKEN_GEN_ALL_CORE_FT
apixaban 5 mg oral tablet: 1 tab(s) orally every 12 hours  Aspirin Enteric Coated 81 mg oral delayed release tablet: 1 tab(s) orally once a day  ferrous gluconate 324 mg (38 mg elemental iron) oral tablet: 1 tab(s) orally once a day  finasteride 5 mg oral tablet: 1 tab(s) orally once a day  Lipitor 80 mg oral tablet: 1 tab(s) orally once a day  Lomotil 2.5 mg-0.025 mg oral tablet: 1 tab(s) orally once a day  Synthroid 88 mcg (0.088 mg) oral tablet: 1 tab(s) orally once a day   apixaban 5 mg oral tablet: 1 tab(s) orally every 12 hours  Aspirin Enteric Coated 81 mg oral delayed release tablet: 1 tab(s) orally once a day  ferrous gluconate 324 mg (38 mg elemental iron) oral tablet: 1 tab(s) orally once a day  finasteride 5 mg oral tablet: 1 tab(s) orally once a day  guaiFENesin 100 mg/5 mL oral liquid: 5 milliliter(s) orally every 6 hours, As needed, Cough  Lipitor 80 mg oral tablet: 1 tab(s) orally once a day  loperamide 2 mg oral capsule: 1 cap(s) orally every 8 hours AS NEEDED  Synthroid 88 mcg (0.088 mg) oral tablet: 1 tab(s) orally once a day

## 2022-06-04 NOTE — DISCHARGE NOTE PROVIDER - NSDCFUADDAPPT_GEN_ALL_CORE_FT
You will be discharged to rehab. Please continue to work with physical therapy to re-gain strength. Please follow up with your PCP in a week after being discharged from rehab for further management

## 2022-06-04 NOTE — DISCHARGE NOTE PROVIDER - NSFOLLOWUPCLINICS_GEN_ALL_ED_FT
[facility]  Hematology/Oncology  [address] [id number]  Phone: [phone]  Fax:

## 2022-06-04 NOTE — DISCHARGE NOTE PROVIDER - HOSPITAL COURSE
89M former smoker w/ copd, CAD s/p CABG, hypothyroid presents for evaluation of chronic diarrhea and weight loss found to have imaging concerning for metastatic carcinoid tumor    Problem/Plan - 1   ·  Problem: Carcinoid tumor.   ·  Plan: imaging concerning for met carcinoid  -  s/p liver biopsy 6/1 f/u results   - cdiff neg  - started on loperamide PRN  - Encouraged use with patient.  -  refusing to drink thickened liquid,  - Also noted to have post op leukocytosis which is imp[roved and now 12 patient noted to have crackle / transmitted BS on exam , get CXR due crackles / transmitted breath sound on exam and  follow up on questionable retrocardiac infiltrate will repeat CXR and result is awaited.    Problem/Plan - 2   ·  Problem: DVT, lower extremity.   ·  Plan: patient had deferred to niece regarding starting ac for now  CT a/p imaging concerning for possible DVT in R common femoral vein  Started on Eliquis 5 mg BID (low dose per family wishes) risk and benefits of AC were explained in detail to the family.    Problem/Plan - 3   ·  Problem: Decubitus skin ulcer.   ·  Plan: local wound care per RN team.    Problem/Plan - 4   ·  Problem: Emphysema lung.   ·  Plan: was noted on CT chest  also found to have multiple nodules on CT chest, CT head neg for mets.    Problem/Plan - 5   ·  Problem: Hypothyroid.   ·  Plan: restarted home meds.    Problem/Plan - 6   ·  Problem: Anemia.   ·  Plan: no signs of acute hemorrhage  - iron studies reviewed, anemia of chronic disease likely 2/2 malignancy   - b12 folate, tsh all wnl  - Hb stable.    Problem/Plan - 7   ·  Problem: Advanced care planning/counseling discussion.   ·  Plan: Health Care Decision Maker: Mr. Dustin Lutz (Patient- A&Ox3)  A MOLST form was completed and placed in the chart. The patient has determined that the harm of resuscitative measures outweighs perceived benefit and has designated code status to be DNR/DNI.    Repeat CXR unchanged b/l small pleural effusion. Patient is at high risk of aspiration, with cough, abnormal lung sounds. CT ordered which shows _______________.  Patient remains stable for DC with close follow up with PCP as per Dr. Gar.      89M former smoker w/ copd, CAD s/p CABG, hypothyroid presents for evaluation of chronic diarrhea and weight loss found to have imaging concerning for metastatic carcinoid tumor     Nutritional Assessment:  · Nutritional Assessment	This patient has been assessed with a concern for Malnutrition and has been determined to have a diagnosis/diagnoses of Severe protein-calorie malnutrition.    This patient is being managed with:   Diet Soft and Bite Sized-  Mildly Thick Liquids (MILDTHICKLIQS)  Entered: Jun 2 2022  5:04PM    The following pending diet order is being considered for treatment of Severe protein-calorie malnutrition:  Diet Regular-  Supplement Feeding Modality:  Oral  Ensure Enlive Cans or Servings Per Day:  2       Frequency:  Daily  Entered: May 28 2022  3:11PM     Problem/Plan - 1:  ·  Problem: Carcinoid tumor.   ·  Plan: imaging concerning for metastatic carcinoid  -  s/p liver biopsy 6/1 ; pathology shows low grade carcinoid tumour  - per Heme/Onc ; treatment is  Octreotide which is generally well tolerated and should improve symptoms like diarrhea  - However given overall  poor performance status and aspiration pneumonia, they would not recommend this treatment at this time  - c/w Loperamide PRN  - f/up serum chromogranin A and urine 5 - HIAA.     Problem/Plan - 2:  ·  Problem: Aspiration pneumonia.   ·  Plan: confirmed PNA on CT chest, CXR with retrocardiac infiltrate, WBC improving, likely aspiration given dysphagia and increased upper airway secretions   - c/w Zosyn (6/4-   )  - blood cx NGTD  - s/p MBS 6/6 ; Severe oropharyngeal dysphagia chronic and progressive in nature in the setting of advanced progressive disease process impacting safety efficiency of swallow function and the patient's ability to sustain nutrition PO.  - SLP reccs NPO if in line with pt's GOC  - d/w HCP Helen  ; she wants to c/w pleasure feeds and is aware of aspiration risk  - Pt's clinical status is declining overall  - life expectancy is about 6 months  - for this reason he is appropriate for hospice care  - HCP is an agreement with inpatient hospice care ; CM facilitating.     Problem/Plan - 3:  ·  Problem: DVT, lower extremity.   ·  Plan: CT a/p imaging concerning for possible DVT in R common femoral vein  Started on Eliquis 5 mg BID (low dose per family wishes) risk and benefits of AC were explained in detail to the family  LE dopplers No evidence of acute deep venous thrombosis in either lower extremity.  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   Eliquis d/johnny.     Problem/Plan - 4:  ·  Problem: Decubitus skin ulcer.   ·  Plan: local wound care per RN team.     Problem/Plan - 5:  ·  Problem: Emphysema lung.   ·  Plan: was noted on CT chest  also found to have multiple nodules on CT chest, CT head neg for mets.     Problem/Plan - 6:  ·  Problem: Hypothyroid.   ·  Plan: - c/w Synthroid.     Problem/Plan - 7:  ·  Problem: Anemia.   ·  Plan: no signs of acute hemorrhage  - iron studies reviewed, anemia of chronic disease likely 2/2 malignancy   - b12 folate, tsh all wnl  - Hb stable.     Problem/Plan - 8:  ·  Problem: Advanced care planning/counseling discussion.   ·  Plan: HCP : Helen : [phone]  Pt is DNR/DNI ; MOLST form in chart.     Problem/Plan - 9:  ·  Problem: Prophylactic measure.   ·  Plan: Dispo: Pt's clinical status is declining overall  - life expectancy is about 6 months  - for this reason he is appropriate for hospice care  - HCP is an agreement with  inpatient hospice care ; CM facilitating.

## 2022-06-05 DIAGNOSIS — J69.0 PNEUMONITIS DUE TO INHALATION OF FOOD AND VOMIT: ICD-10-CM

## 2022-06-05 LAB
ALBUMIN SERPL ELPH-MCNC: 2.5 G/DL — LOW (ref 3.3–5)
ALP SERPL-CCNC: 279 U/L — HIGH (ref 40–120)
ALT FLD-CCNC: 20 U/L — SIGNIFICANT CHANGE UP (ref 10–45)
ANION GAP SERPL CALC-SCNC: 12 MMOL/L — SIGNIFICANT CHANGE UP (ref 5–17)
AST SERPL-CCNC: 20 U/L — SIGNIFICANT CHANGE UP (ref 10–40)
BILIRUB SERPL-MCNC: 1.1 MG/DL — SIGNIFICANT CHANGE UP (ref 0.2–1.2)
BUN SERPL-MCNC: 44 MG/DL — HIGH (ref 7–23)
CALCIUM SERPL-MCNC: 10.3 MG/DL — SIGNIFICANT CHANGE UP (ref 8.4–10.5)
CHLORIDE SERPL-SCNC: 107 MMOL/L — SIGNIFICANT CHANGE UP (ref 96–108)
CO2 SERPL-SCNC: 20 MMOL/L — LOW (ref 22–31)
CREAT SERPL-MCNC: 1.27 MG/DL — SIGNIFICANT CHANGE UP (ref 0.5–1.3)
EGFR: 52 ML/MIN/1.73M2 — LOW
GLUCOSE SERPL-MCNC: 96 MG/DL — SIGNIFICANT CHANGE UP (ref 70–99)
HCT VFR BLD CALC: 41.5 % — SIGNIFICANT CHANGE UP (ref 39–50)
HGB BLD-MCNC: 12.4 G/DL — LOW (ref 13–17)
MCHC RBC-ENTMCNC: 26.8 PG — LOW (ref 27–34)
MCHC RBC-ENTMCNC: 29.9 GM/DL — LOW (ref 32–36)
MCV RBC AUTO: 89.8 FL — SIGNIFICANT CHANGE UP (ref 80–100)
NRBC # BLD: 0 /100 WBCS — SIGNIFICANT CHANGE UP (ref 0–0)
PLATELET # BLD AUTO: 232 K/UL — SIGNIFICANT CHANGE UP (ref 150–400)
POTASSIUM SERPL-MCNC: 4.7 MMOL/L — SIGNIFICANT CHANGE UP (ref 3.5–5.3)
POTASSIUM SERPL-SCNC: 4.7 MMOL/L — SIGNIFICANT CHANGE UP (ref 3.5–5.3)
PROT SERPL-MCNC: 6.6 G/DL — SIGNIFICANT CHANGE UP (ref 6–8.3)
RBC # BLD: 4.62 M/UL — SIGNIFICANT CHANGE UP (ref 4.2–5.8)
RBC # FLD: 16.2 % — HIGH (ref 10.3–14.5)
SODIUM SERPL-SCNC: 139 MMOL/L — SIGNIFICANT CHANGE UP (ref 135–145)
WBC # BLD: 20.23 K/UL — HIGH (ref 3.8–10.5)
WBC # FLD AUTO: 20.23 K/UL — HIGH (ref 3.8–10.5)

## 2022-06-05 PROCEDURE — 99233 SBSQ HOSP IP/OBS HIGH 50: CPT

## 2022-06-05 RX ORDER — SODIUM CHLORIDE 9 MG/ML
1000 INJECTION, SOLUTION INTRAVENOUS
Refills: 0 | Status: DISCONTINUED | OUTPATIENT
Start: 2022-06-05 | End: 2022-06-10

## 2022-06-05 RX ADMIN — PIPERACILLIN AND TAZOBACTAM 25 GRAM(S): 4; .5 INJECTION, POWDER, LYOPHILIZED, FOR SOLUTION INTRAVENOUS at 20:57

## 2022-06-05 RX ADMIN — PIPERACILLIN AND TAZOBACTAM 25 GRAM(S): 4; .5 INJECTION, POWDER, LYOPHILIZED, FOR SOLUTION INTRAVENOUS at 14:41

## 2022-06-05 RX ADMIN — ATORVASTATIN CALCIUM 80 MILLIGRAM(S): 80 TABLET, FILM COATED ORAL at 21:02

## 2022-06-05 RX ADMIN — Medication 81 MILLIGRAM(S): at 13:32

## 2022-06-05 RX ADMIN — Medication 2 MILLIGRAM(S): at 21:01

## 2022-06-05 RX ADMIN — SODIUM CHLORIDE 80 MILLILITER(S): 9 INJECTION, SOLUTION INTRAVENOUS at 08:42

## 2022-06-05 RX ADMIN — APIXABAN 5 MILLIGRAM(S): 2.5 TABLET, FILM COATED ORAL at 06:18

## 2022-06-05 RX ADMIN — Medication 2 MILLIGRAM(S): at 06:18

## 2022-06-05 RX ADMIN — PIPERACILLIN AND TAZOBACTAM 25 GRAM(S): 4; .5 INJECTION, POWDER, LYOPHILIZED, FOR SOLUTION INTRAVENOUS at 04:08

## 2022-06-05 RX ADMIN — FINASTERIDE 5 MILLIGRAM(S): 5 TABLET, FILM COATED ORAL at 13:31

## 2022-06-05 RX ADMIN — APIXABAN 5 MILLIGRAM(S): 2.5 TABLET, FILM COATED ORAL at 17:14

## 2022-06-05 RX ADMIN — Medication 88 MICROGRAM(S): at 06:18

## 2022-06-05 NOTE — PROGRESS NOTE ADULT - PROBLEM SELECTOR PLAN 2
confirmed PNA on CT chest, CXr with retrocardiac infiltrate, WBC improving, likely aspiration given dysphagia and increased upper airway secretions   - c/w Zosyn (6/4-   )  - f/u blood cx   - Started on LR 80cc/ hr x 10 hr

## 2022-06-05 NOTE — PROGRESS NOTE ADULT - PROBLEM SELECTOR PLAN 8
Health Care Decision Maker: Mr. Dustin Lutz (Patient- A&Ox3)  A MOLST form was completed and placed in the chart. The patient has determined that the harm of resuscitative measures outweighs perceived benefit and has designated code status to be DNR/DNI

## 2022-06-05 NOTE — PROGRESS NOTE ADULT - SUBJECTIVE AND OBJECTIVE BOX
Patient is a 89y old  Male who presents with a chief complaint of 89M p/w chronic diarrhea and weight loss (04 Jun 2022 15:24)      SUBJECTIVE / OVERNIGHT EVENTS: Patient seen and examined at bedside. States that he feels ok, he continues to refusing thickened fluids, d/c cancelled yesterday due to new PNA likley aspiration.     ROS:  All other review of systems negative    Allergies    No Known Allergies    Intolerances        MEDICATIONS  (STANDING):  apixaban 5 milliGRAM(s) Oral every 12 hours  aspirin enteric coated 81 milliGRAM(s) Oral daily  atorvastatin 80 milliGRAM(s) Oral at bedtime  finasteride 5 milliGRAM(s) Oral daily  lactated ringers. 1000 milliLiter(s) (80 mL/Hr) IV Continuous <Continuous>  levothyroxine 88 MICROGram(s) Oral daily  loperamide 2 milliGRAM(s) Oral every 8 hours  piperacillin/tazobactam IVPB.. 3.375 Gram(s) IV Intermittent every 8 hours    MEDICATIONS  (PRN):      Vital Signs Last 24 Hrs  T(C): 36.6 (05 Jun 2022 06:15), Max: 36.9 (04 Jun 2022 12:03)  T(F): 97.8 (05 Jun 2022 06:15), Max: 98.4 (04 Jun 2022 12:03)  HR: 86 (05 Jun 2022 06:15) (83 - 89)  BP: 98/67 (05 Jun 2022 06:15) (98/67 - 111/73)  BP(mean): --  RR: 18 (05 Jun 2022 06:15) (16 - 18)  SpO2: 95% (05 Jun 2022 06:15) (93% - 95%)  CAPILLARY BLOOD GLUCOSE        I&O's Summary    04 Jun 2022 07:01  -  05 Jun 2022 07:00  --------------------------------------------------------  IN: 240 mL / OUT: 0 mL / NET: 240 mL    05 Jun 2022 07:01  -  05 Jun 2022 10:17  --------------------------------------------------------  IN: 260 mL / OUT: 0 mL / NET: 260 mL        PHYSICAL EXAM:  GENERAL: cachectic, + [place]   HEAD:  Atraumatic, Normocephalic  EYES: EOMI, PERRLA, conjunctiva and sclera clear  NECK: Supple, No JVD  CHEST/LUNG: bibasilar crackles noted; No wheeze  HEART: Regular rate and rhythm; No murmurs, rubs, or gallops  ABDOMEN: Soft, Nontender, Nondistended; Bowel sounds present  EXTREMITIES:  2+ Peripheral Pulses, No clubbing, cyanosis, or edema  NEUROLOGY: AAOx3, non-focal  PSYCH: calm  SKIN: No rashes or lesions    LABS:                        12.2   12.55 )-----------( 214      ( 04 Jun 2022 08:10 )             40.4     06-04    139  |  104  |  44<H>  ----------------------------<  70  4.1   |  25  |  1.21    Ca    10.0      04 Jun 2022 08:03    TPro  6.3  /  Alb  2.7<L>  /  TBili  1.0  /  DBili  x   /  AST  19  /  ALT  19  /  AlkPhos  300<H>  06-04              RADIOLOGY & ADDITIONAL TESTS:    Care Discussed with Consultants/Other Providers: Medicine ACP

## 2022-06-06 LAB
ALBUMIN SERPL ELPH-MCNC: 2.4 G/DL — LOW (ref 3.3–5)
ALP SERPL-CCNC: 294 U/L — HIGH (ref 40–120)
ALT FLD-CCNC: 20 U/L — SIGNIFICANT CHANGE UP (ref 10–45)
ANION GAP SERPL CALC-SCNC: 8 MMOL/L — SIGNIFICANT CHANGE UP (ref 5–17)
AST SERPL-CCNC: 22 U/L — SIGNIFICANT CHANGE UP (ref 10–40)
BASOPHILS # BLD AUTO: 0.02 K/UL — SIGNIFICANT CHANGE UP (ref 0–0.2)
BASOPHILS NFR BLD AUTO: 0.2 % — SIGNIFICANT CHANGE UP (ref 0–2)
BILIRUB SERPL-MCNC: 1.2 MG/DL — SIGNIFICANT CHANGE UP (ref 0.2–1.2)
BUN SERPL-MCNC: 41 MG/DL — HIGH (ref 7–23)
CALCIUM SERPL-MCNC: 10 MG/DL — SIGNIFICANT CHANGE UP (ref 8.4–10.5)
CHLORIDE SERPL-SCNC: 105 MMOL/L — SIGNIFICANT CHANGE UP (ref 96–108)
CO2 SERPL-SCNC: 27 MMOL/L — SIGNIFICANT CHANGE UP (ref 22–31)
CREAT SERPL-MCNC: 1.22 MG/DL — SIGNIFICANT CHANGE UP (ref 0.5–1.3)
EGFR: 55 ML/MIN/1.73M2 — LOW
EOSINOPHIL # BLD AUTO: 0.01 K/UL — SIGNIFICANT CHANGE UP (ref 0–0.5)
EOSINOPHIL NFR BLD AUTO: 0.1 % — SIGNIFICANT CHANGE UP (ref 0–6)
GLUCOSE SERPL-MCNC: 113 MG/DL — HIGH (ref 70–99)
HCT VFR BLD CALC: 37.1 % — LOW (ref 39–50)
HGB BLD-MCNC: 11.3 G/DL — LOW (ref 13–17)
IMM GRANULOCYTES NFR BLD AUTO: 1.2 % — SIGNIFICANT CHANGE UP (ref 0–1.5)
LYMPHOCYTES # BLD AUTO: 0.69 K/UL — LOW (ref 1–3.3)
LYMPHOCYTES # BLD AUTO: 5.2 % — LOW (ref 13–44)
MCHC RBC-ENTMCNC: 27 PG — SIGNIFICANT CHANGE UP (ref 27–34)
MCHC RBC-ENTMCNC: 30.5 GM/DL — LOW (ref 32–36)
MCV RBC AUTO: 88.8 FL — SIGNIFICANT CHANGE UP (ref 80–100)
MONOCYTES # BLD AUTO: 1.35 K/UL — HIGH (ref 0–0.9)
MONOCYTES NFR BLD AUTO: 10.2 % — SIGNIFICANT CHANGE UP (ref 2–14)
NEUTROPHILS # BLD AUTO: 10.97 K/UL — HIGH (ref 1.8–7.4)
NEUTROPHILS NFR BLD AUTO: 83.1 % — HIGH (ref 43–77)
NRBC # BLD: 0 /100 WBCS — SIGNIFICANT CHANGE UP (ref 0–0)
PLATELET # BLD AUTO: 197 K/UL — SIGNIFICANT CHANGE UP (ref 150–400)
POTASSIUM SERPL-MCNC: 3.6 MMOL/L — SIGNIFICANT CHANGE UP (ref 3.5–5.3)
POTASSIUM SERPL-SCNC: 3.6 MMOL/L — SIGNIFICANT CHANGE UP (ref 3.5–5.3)
PROT SERPL-MCNC: 5.8 G/DL — LOW (ref 6–8.3)
RBC # BLD: 4.18 M/UL — LOW (ref 4.2–5.8)
RBC # FLD: 16.2 % — HIGH (ref 10.3–14.5)
SODIUM SERPL-SCNC: 140 MMOL/L — SIGNIFICANT CHANGE UP (ref 135–145)
WBC # BLD: 13.2 K/UL — HIGH (ref 3.8–10.5)
WBC # FLD AUTO: 13.2 K/UL — HIGH (ref 3.8–10.5)

## 2022-06-06 PROCEDURE — 74230 X-RAY XM SWLNG FUNCJ C+: CPT | Mod: 26

## 2022-06-06 PROCEDURE — 99233 SBSQ HOSP IP/OBS HIGH 50: CPT

## 2022-06-06 RX ORDER — SODIUM CHLORIDE 9 MG/ML
1000 INJECTION, SOLUTION INTRAVENOUS
Refills: 0 | Status: DISCONTINUED | OUTPATIENT
Start: 2022-06-06 | End: 2022-06-10

## 2022-06-06 RX ADMIN — FINASTERIDE 5 MILLIGRAM(S): 5 TABLET, FILM COATED ORAL at 12:40

## 2022-06-06 RX ADMIN — Medication 88 MICROGRAM(S): at 05:57

## 2022-06-06 RX ADMIN — Medication 2 MILLIGRAM(S): at 21:28

## 2022-06-06 RX ADMIN — ATORVASTATIN CALCIUM 80 MILLIGRAM(S): 80 TABLET, FILM COATED ORAL at 21:29

## 2022-06-06 RX ADMIN — PIPERACILLIN AND TAZOBACTAM 25 GRAM(S): 4; .5 INJECTION, POWDER, LYOPHILIZED, FOR SOLUTION INTRAVENOUS at 12:40

## 2022-06-06 RX ADMIN — PIPERACILLIN AND TAZOBACTAM 25 GRAM(S): 4; .5 INJECTION, POWDER, LYOPHILIZED, FOR SOLUTION INTRAVENOUS at 21:29

## 2022-06-06 RX ADMIN — APIXABAN 5 MILLIGRAM(S): 2.5 TABLET, FILM COATED ORAL at 16:51

## 2022-06-06 RX ADMIN — APIXABAN 5 MILLIGRAM(S): 2.5 TABLET, FILM COATED ORAL at 05:57

## 2022-06-06 RX ADMIN — Medication 2 MILLIGRAM(S): at 05:57

## 2022-06-06 RX ADMIN — PIPERACILLIN AND TAZOBACTAM 25 GRAM(S): 4; .5 INJECTION, POWDER, LYOPHILIZED, FOR SOLUTION INTRAVENOUS at 05:59

## 2022-06-06 RX ADMIN — SODIUM CHLORIDE 75 MILLILITER(S): 9 INJECTION, SOLUTION INTRAVENOUS at 16:18

## 2022-06-06 RX ADMIN — Medication 81 MILLIGRAM(S): at 12:40

## 2022-06-06 NOTE — PROGRESS NOTE ADULT - PROBLEM SELECTOR PLAN 2
confirmed PNA on CT chest, CXR with retrocardiac infiltrate, WBC improving, likely aspiration given dysphagia and increased upper airway secretions   - c/w Zosyn (6/4-   )  - blood cx NGTD  - for MBS today

## 2022-06-06 NOTE — PROGRESS NOTE ADULT - SUBJECTIVE AND OBJECTIVE BOX
Patient is a 89y old  Male who presents with a chief complaint of 89M p/w chronic diarrhea and weight loss (06 Jun 2022 11:47)      SUBJECTIVE / OVERNIGHT EVENTS:  Pt seen and examined with family at bedside. No acute events overnight. He denies CP, fever/chills. c/o SOB. Sating 96% on room air.     MEDICATIONS  (STANDING):  apixaban 5 milliGRAM(s) Oral every 12 hours  aspirin enteric coated 81 milliGRAM(s) Oral daily  atorvastatin 80 milliGRAM(s) Oral at bedtime  finasteride 5 milliGRAM(s) Oral daily  lactated ringers. 1000 milliLiter(s) (80 mL/Hr) IV Continuous <Continuous>  levothyroxine 88 MICROGram(s) Oral daily  loperamide 2 milliGRAM(s) Oral every 8 hours  piperacillin/tazobactam IVPB.. 3.375 Gram(s) IV Intermittent every 8 hours    MEDICATIONS  (PRN):  guaiFENesin Oral Liquid (Sugar-Free) 100 milliGRAM(s) Oral every 6 hours PRN Cough      Vital Signs Last 24 Hrs  T(C): 36.4 (06 Jun 2022 12:37), Max: 36.5 (05 Jun 2022 16:41)  T(F): 97.5 (06 Jun 2022 12:37), Max: 97.7 (05 Jun 2022 16:41)  HR: 66 (06 Jun 2022 12:37) (66 - 79)  BP: 91/61 (06 Jun 2022 12:37) (91/61 - 114/77)  BP(mean): --  RR: 16 (06 Jun 2022 12:37) (16 - 18)  SpO2: 96% (06 Jun 2022 12:37) (94% - 96%)  CAPILLARY BLOOD GLUCOSE        I&O's Summary    05 Jun 2022 07:01  -  06 Jun 2022 07:00  --------------------------------------------------------  IN: 1145 mL / OUT: 0 mL / NET: 1145 mL        PHYSICAL EXAM:  GENERAL: cachectic, + [place]   HEAD:  Atraumatic, Normocephalic  EYES: EOMI, PERRLA, conjunctiva and sclera clear  NECK: Supple, No JVD  CHEST/LUNG: bibasilar crackles noted; No wheeze  HEART: Regular rate and rhythm; No murmurs, rubs, or gallops  ABDOMEN: Soft, Nontender, Nondistended; Bowel sounds present  EXTREMITIES:  2+ Peripheral Pulses, No clubbing, cyanosis, or edema  NEUROLOGY: AAOx3, non-focal  PSYCH: calm  SKIN: No rashes or lesions    LABS:                        11.3   13.20 )-----------( 197      ( 06 Jun 2022 10:13 )             37.1     06-06    140  |  105  |  41<H>  ----------------------------<  113<H>  3.6   |  27  |  1.22    Ca    10.0      06 Jun 2022 10:13    TPro  5.8<L>  /  Alb  2.4<L>  /  TBili  1.2  /  DBili  x   /  AST  22  /  ALT  20  /  AlkPhos  294<H>  06-06                  Care Discussed with Consultants/Other Providers: Heme- Onc ; will help facilitate liver biopsy results

## 2022-06-06 NOTE — SWALLOW VFSS/MBS ASSESSMENT ADULT - ORAL PREP COMMENTS
Reduced orientation/reception; pt with reduced spoon stripping upon presentation; open mouth posturing. Eventually effective.

## 2022-06-06 NOTE — SWALLOW VFSS/MBS ASSESSMENT ADULT - ADDITIONAL INFORMATION
+LARGE OSTEOPHYTE approx. level 3  NEGATIVELY IMPACTING inversion of epiglottis.     Pt was encountered in TAN Chair. NAD. RA. Noted trace blood on R side oral cavity, pt stating his mouth is 'really dry'. Also appreciated bleeding on his L ear-appearing to be from the mask string; provided care. Call was placed to VEDA Kendrick to inform on state of the patient. Pt endorsed able to continue for test.     Offered patient Varibar thin liquids, mildly thick liquids and honey / moderately thick liquids via 1/2 tsp amounts. Pt required assistance w/ all presentations given deconditioned state. Of note, pt declined to continue with putting up his hands and requesting to terminate. He frequently requested to have 'some water' throughout testing.

## 2022-06-06 NOTE — SWALLOW VFSS/MBS ASSESSMENT ADULT - ORAL PHASE COMMENTS
Oral phase c/b reduced control of bolus leading to premature spillover in the oropharynx and notable tongue pumping across trials to facilitate posterior transfer.

## 2022-06-06 NOTE — SWALLOW VFSS/MBS ASSESSMENT ADULT - RECOMMENDED CONSISTENCY
Given severe impairments in swallow safety and efficiency, primary recommendation is for NPO, however if this DOES NOT align w/ patients GOC then would encourage pleasure feeds and conversation with family to establish goals of care  --Consider discussion with the patient and family re: goals of care to determine appropriate least restrictive diet for comfort care.   --Due to advanced age / progressive disease process, pt demonstrates poor prognostic indicators for peg placement.  Recommendations:  (1) Patient/caregiver/physician discussion regarding goals of care as they relate to nutrition/hydration. Consider PO comfort feeding despite high risk for PNA/malnutrition/dehydration.   (2) Should patient/caregiver opt for PO comfort feeding, the least harmful may be for more conservative textures such as puree textures, keeping in mind that patient adamantly declines thickened liquids.   (3) Reduce risk for aspiration PNA via stringent oral care to reduced oral pathogens and strict aspiration precautions such as optimal position when eating/drinking.  (4) SLP will fu w/ team for POC discussion & caregiver training as warranted

## 2022-06-06 NOTE — CHART NOTE - NSCHARTNOTEFT_GEN_A_CORE
Notified by speech pathologist that pt didn't do well on MBS. Currently report is pending. Messages related to Dr. Hung and she said c/w current soft bite size diet w/ mildly thicken liquid and she will have GOC discussion with Johana (HCP) tmrw . Also Called Johana and explained pt didn't do well on MBS and they might recommends NPO if aligns w/ GOC. Johana agrees with continuing current diet today saying "why would I stop the diet for him." and she will make further decision about GOC tmrw after seeing MBS report w attending. Will c/w current diet w/ aspiration precaution. C/w zosyn for pna coverage. VSS. Will continue to closely monitor.

## 2022-06-06 NOTE — SWALLOW VFSS/MBS ASSESSMENT ADULT - ORAL PHASE
Delayed oral transit time/Residue in oral cavity/Incomplete tongue to palate contact/Uncontrolled bolus / spillover in yung-pharynx/Uncontrolled bolus / spillover in hypopharynx

## 2022-06-06 NOTE — SWALLOW VFSS/MBS ASSESSMENT ADULT - DIAGNOSTIC IMPRESSIONS
Mr. 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  High suspicion for PNA, CT Chest confirmed new PNA. Currently, pt is p/w a severe impairment for oropharyngeal swallow skills. Oral phase marked by prolonged a-p transport, reduced control, and reduced orientation/reception. Pharyngeal phase marked by latency in the swallow response, reduced epiglottic inversion/deflection in suspected presence of structural component (large osteophyte), leading to subsequent aspiration events w/ textures presented.     Disorders: reduced lingual strength/ROM/Rate of motion, reduced BOT to posterior pharyngeal wall contact, delay in trigger of the swallow reflex, reduced hyo-laryngeal excursion, reduced laryngeal closure, and reduced pharyngeal contractility.   Severe oropharyngeal dysphagia chronic and progressive in nature in the setting of advanced progressive disease process impacting safety efficiency of swallow function and the patient's ability to sustain nutrition PO Mr. 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  High suspicion for PNA, CT Chest confirmed new PNA. Currently, pt is p/w a severe impairment for oropharyngeal swallow skills. Oral phase marked by prolonged a-p transport, reduced control, and reduced orientation/reception. Pharyngeal phase marked by latency in the swallow response, reduced epiglottic inversion/deflection in suspected presence of structural component (large osteophyte), leading to subsequent aspiration events w/ textures presented.     Disorders: reduced lingual strength/ROM/Rate of motion, reduced BOT to posterior pharyngeal wall contact, delay in trigger of the swallow reflex, reduced hyo-laryngeal excursion, reduced laryngeal closure, and reduced pharyngeal contractility.   Severe oropharyngeal dysphagia chronic and progressive in nature in the setting of advanced progressive disease process impacting safety efficiency of swallow function and the patient's ability to sustain nutrition PO. It should be noted that although limited trials were able to be provided, can assume given observations from those trials that pt is a high aspiration risk for all po textures.

## 2022-06-06 NOTE — PROGRESS NOTE ADULT - PROBLEM SELECTOR PLAN 8
Health Care Decision Maker: Mr. Dustin Lutz (Patient- A&Ox3)  HCP : Johana : [phone]  Pt is DNR/DNI ; MOLST form in chart

## 2022-06-06 NOTE — SWALLOW VFSS/MBS ASSESSMENT ADULT - BEHAVIORAL MODIFICATIONS
Small presentations   Pt not able to properly / efficiently following strategies ; additionally complaint of discomfort and request to decline additional trials

## 2022-06-06 NOTE — SWALLOW VFSS/MBS ASSESSMENT ADULT - ADDITIONAL RECOMMENDATIONS
GOAL; Pt / family to adhere oral hygiene practices during course   GOAL; Pt/family to adhere to aspiration precautions during course

## 2022-06-06 NOTE — SWALLOW VFSS/MBS ASSESSMENT ADULT - PHARYNGEAL PHASE COMMENTS
Pharyngeal phase c/b latency in the swallow trigger to the level of the valleculae with spillover to the pyriform sinuses. Base of tongue/BoT retraction and pharyngeal constriction were reduced and resulted in reduced pharyngeal bolus propulsion. Mild-moderate vallecular residue present as compared to bolus size presented.  Hyolaryngeal elevation and excursion were reduced. Incomplete epiglottic inversion/deflection- presence of LARGE Osteophyte negatively impacting movement; epiglottis appearing to abut the PPW. Reduced relaxation of UES.   -Mildly thick liquids via tsp- bolus navigation towards arytenoids with spillover to airway with aspiration-Pt was sensate.   -Thin liquids via 1/2 tsp amount- navigation towards airway with eventual aspiration of bolus - sensate to aspiration. Poor ability to expectorate despite cues to 'cough again'.   -Honey/moderately thick liquids via 1/4 - 1/2 tsp amount appreciated similar findings of bolus navigation towards arytenoids however did not visualize aspiration event; appreciated cough response off fluro. Suspect aspiration event occurred off fluro on possible residue vs moderately thick liquid consistency.    To note, high shoulder girdle obscured viewing throughout this study.

## 2022-06-06 NOTE — SWALLOW VFSS/MBS ASSESSMENT ADULT - COMMENTS
Continued history:  He reports seeking medical attention at a nearby hospital but cannot recall the name ([facility]?) and was told that he had spots on his liver and that he would need to follow up with his pcp. He has been experiencing fatigue, loss of appetite and dry cough as well. He reports being concerned he may have cancer and would want to know if anything could be done but if chances of remission are limited then he would not want aggressive care and would want to be "let go."  -CT scan with spiculated mesenteric mass suspicious for carcinoid tumor; multiple bilobar hepatic metastases. CT chest with non-specific pulm nodules  -Recommend IR biopsy of liver lesion; of note patient had FNA bx of liver lesion at [facility] that was inconclusive.   -IR for 6/1-  -Oncology consulted   -leukocytosis+  -d/c cancelled 6/4 due to new PNA likely aspiration.     CT Chest : 5/25 : Left apical 0.3 cm nodule, left upper lobe 0.7 cm ground-glass nodule, and clustered micronodules at the periphery of the right base; indeterminate, but may be infectious/inflammatory in etiology. CT chest  follow-up in 3 months recommended to assess stability. Small right pleural effusion and trace loculated left pleural effusion.    Swallow history: Pt known to this service during this admission. CSE completed w/ recommendations for nectar/mildly thick liquids and regular solids w/ completion of MBS given concern for penetration/aspiration; if aligned w/ GOC. No imaging in PACS.

## 2022-06-07 LAB
ALBUMIN SERPL ELPH-MCNC: 1.8 G/DL — LOW (ref 3.3–5)
ALP SERPL-CCNC: 451 U/L — HIGH (ref 40–120)
ALT FLD-CCNC: 27 U/L — SIGNIFICANT CHANGE UP (ref 10–45)
ANION GAP SERPL CALC-SCNC: 10 MMOL/L — SIGNIFICANT CHANGE UP (ref 5–17)
AST SERPL-CCNC: 44 U/L — HIGH (ref 10–40)
BILIRUB SERPL-MCNC: 1.5 MG/DL — HIGH (ref 0.2–1.2)
BUN SERPL-MCNC: 33 MG/DL — HIGH (ref 7–23)
CALCIUM SERPL-MCNC: 9.5 MG/DL — SIGNIFICANT CHANGE UP (ref 8.4–10.5)
CHLORIDE SERPL-SCNC: 104 MMOL/L — SIGNIFICANT CHANGE UP (ref 96–108)
CO2 SERPL-SCNC: 25 MMOL/L — SIGNIFICANT CHANGE UP (ref 22–31)
CREAT SERPL-MCNC: 1.08 MG/DL — SIGNIFICANT CHANGE UP (ref 0.5–1.3)
EGFR: 64 ML/MIN/1.73M2 — SIGNIFICANT CHANGE UP
GLUCOSE SERPL-MCNC: 87 MG/DL — SIGNIFICANT CHANGE UP (ref 70–99)
HCT VFR BLD CALC: 35.8 % — LOW (ref 39–50)
HGB BLD-MCNC: 11 G/DL — LOW (ref 13–17)
MCHC RBC-ENTMCNC: 27.1 PG — SIGNIFICANT CHANGE UP (ref 27–34)
MCHC RBC-ENTMCNC: 30.7 GM/DL — LOW (ref 32–36)
MCV RBC AUTO: 88.2 FL — SIGNIFICANT CHANGE UP (ref 80–100)
NON-GYNECOLOGICAL CYTOLOGY STUDY: SIGNIFICANT CHANGE UP
NRBC # BLD: 0 /100 WBCS — SIGNIFICANT CHANGE UP (ref 0–0)
PLATELET # BLD AUTO: 187 K/UL — SIGNIFICANT CHANGE UP (ref 150–400)
POTASSIUM SERPL-MCNC: 4.3 MMOL/L — SIGNIFICANT CHANGE UP (ref 3.5–5.3)
POTASSIUM SERPL-SCNC: 4.3 MMOL/L — SIGNIFICANT CHANGE UP (ref 3.5–5.3)
PROT SERPL-MCNC: 5.5 G/DL — LOW (ref 6–8.3)
RBC # BLD: 4.06 M/UL — LOW (ref 4.2–5.8)
RBC # FLD: 16.2 % — HIGH (ref 10.3–14.5)
SARS-COV-2 RNA SPEC QL NAA+PROBE: SIGNIFICANT CHANGE UP
SODIUM SERPL-SCNC: 139 MMOL/L — SIGNIFICANT CHANGE UP (ref 135–145)
WBC # BLD: 12.09 K/UL — HIGH (ref 3.8–10.5)
WBC # FLD AUTO: 12.09 K/UL — HIGH (ref 3.8–10.5)

## 2022-06-07 PROCEDURE — 99233 SBSQ HOSP IP/OBS HIGH 50: CPT

## 2022-06-07 RX ADMIN — Medication 88 MICROGRAM(S): at 05:25

## 2022-06-07 RX ADMIN — PIPERACILLIN AND TAZOBACTAM 25 GRAM(S): 4; .5 INJECTION, POWDER, LYOPHILIZED, FOR SOLUTION INTRAVENOUS at 11:42

## 2022-06-07 RX ADMIN — ATORVASTATIN CALCIUM 80 MILLIGRAM(S): 80 TABLET, FILM COATED ORAL at 21:31

## 2022-06-07 RX ADMIN — PIPERACILLIN AND TAZOBACTAM 25 GRAM(S): 4; .5 INJECTION, POWDER, LYOPHILIZED, FOR SOLUTION INTRAVENOUS at 04:25

## 2022-06-07 RX ADMIN — Medication 81 MILLIGRAM(S): at 11:42

## 2022-06-07 RX ADMIN — APIXABAN 5 MILLIGRAM(S): 2.5 TABLET, FILM COATED ORAL at 17:41

## 2022-06-07 RX ADMIN — Medication 2 MILLIGRAM(S): at 21:31

## 2022-06-07 RX ADMIN — Medication 2 MILLIGRAM(S): at 05:25

## 2022-06-07 RX ADMIN — PIPERACILLIN AND TAZOBACTAM 25 GRAM(S): 4; .5 INJECTION, POWDER, LYOPHILIZED, FOR SOLUTION INTRAVENOUS at 21:34

## 2022-06-07 RX ADMIN — FINASTERIDE 5 MILLIGRAM(S): 5 TABLET, FILM COATED ORAL at 11:42

## 2022-06-07 RX ADMIN — APIXABAN 5 MILLIGRAM(S): 2.5 TABLET, FILM COATED ORAL at 05:25

## 2022-06-07 NOTE — PROGRESS NOTE ADULT - PROBLEM SELECTOR PLAN 3
CT a/p imaging concerning for possible DVT in R common femoral vein  Started on Eliquis 5 mg BID (low dose per family wishes) risk and benefits of AC were explained in detail to the family

## 2022-06-07 NOTE — PROGRESS NOTE ADULT - SUBJECTIVE AND OBJECTIVE BOX
Patient is a 89y old  Male who presents with a chief complaint of 89M p/w chronic diarrhea and weight loss (07 Jun 2022 13:05)      SUBJECTIVE / OVERNIGHT EVENTS: Pt seen and examined with niece at bedside. No acute events overnight. Pt denies cp, sob.     MEDICATIONS  (STANDING):  apixaban 5 milliGRAM(s) Oral every 12 hours  aspirin enteric coated 81 milliGRAM(s) Oral daily  atorvastatin 80 milliGRAM(s) Oral at bedtime  finasteride 5 milliGRAM(s) Oral daily  lactated ringers. 1000 milliLiter(s) (75 mL/Hr) IV Continuous <Continuous>  lactated ringers. 1000 milliLiter(s) (80 mL/Hr) IV Continuous <Continuous>  levothyroxine 88 MICROGram(s) Oral daily  loperamide 2 milliGRAM(s) Oral every 8 hours  piperacillin/tazobactam IVPB.. 3.375 Gram(s) IV Intermittent every 8 hours    MEDICATIONS  (PRN):  guaiFENesin Oral Liquid (Sugar-Free) 100 milliGRAM(s) Oral every 6 hours PRN Cough      Vital Signs Last 24 Hrs  T(C): 36.5 (07 Jun 2022 12:18), Max: 37 (06 Jun 2022 20:32)  T(F): 97.7 (07 Jun 2022 12:18), Max: 98.6 (06 Jun 2022 20:32)  HR: 67 (07 Jun 2022 12:18) (66 - 68)  BP: 111/73 (07 Jun 2022 12:18) (103/65 - 111/73)  BP(mean): 78 (07 Jun 2022 06:27) (78 - 78)  RR: 18 (07 Jun 2022 12:18) (18 - 18)  SpO2: 94% (07 Jun 2022 12:18) (94% - 97%)  CAPILLARY BLOOD GLUCOSE        I&O's Summary    06 Jun 2022 07:01  -  07 Jun 2022 07:00  --------------------------------------------------------  IN: 345 mL / OUT: 0 mL / NET: 345 mL    07 Jun 2022 07:01  -  07 Jun 2022 19:32  --------------------------------------------------------  IN: 600 mL / OUT: 0 mL / NET: 600 mL        PHYSICAL EXAM:  GENERAL: cachectic, + [place]   HEAD:  Atraumatic, Normocephalic  EYES: conjunctiva and sclera clear  NECK: Supple, No JVD  CHEST/LUNG: bibasilar crackles noted; No wheeze  HEART: Regular rate and rhythm; No murmurs, rubs, or gallops  ABDOMEN: Soft, Nontender, Nondistended; Bowel sounds present  EXTREMITIES:  2+ Peripheral Pulses, No clubbing, cyanosis, or edema  NEUROLOGY: AAOx3, non-focal  PSYCH: calm  SKIN: No rashes or lesions    LABS:                        11.0   12.09 )-----------( 187      ( 07 Jun 2022 10:53 )             35.8     06-07    139  |  104  |  33<H>  ----------------------------<  87  4.3   |  25  |  1.08    Ca    9.5      07 Jun 2022 10:54    TPro  5.5<L>  /  Alb  1.8<L>  /  TBili  1.5<H>  /  DBili  x   /  AST  44<H>  /  ALT  27  /  AlkPhos  451<H>  06-07                Consultant(s) Notes Reviewed:  SLP    Care Discussed with Consultants/Other Providers: Heme- Onc

## 2022-06-07 NOTE — PROGRESS NOTE ADULT - PROBLEM SELECTOR PLAN 2
confirmed PNA on CT chest, CXR with retrocardiac infiltrate, WBC improving, likely aspiration given dysphagia and increased upper airway secretions   - c/w Zosyn (6/4-   )  - blood cx NGTD  - s/p MBS 6/6 ; Severe oropharyngeal dysphagia chronic and progressive in nature in the setting of advanced progressive disease process impacting safety efficiency of swallow function and the patient's ability to sustain nutrition PO.  - SLP reccs NPO if in line with pt's GOC  - d/w HCP Krsistine at bedside ; she wants to c/w pleasure feeds and is aware of aspiration risk  - she is an agreement with  possible long term care placement w/palliative care ; CM facilitating  - Pt is not a candidate for inpatient hospice and home hospice is not a feasible option for family

## 2022-06-08 LAB
ALBUMIN SERPL ELPH-MCNC: 2.2 G/DL — LOW (ref 3.3–5)
ALP SERPL-CCNC: 565 U/L — HIGH (ref 40–120)
ALT FLD-CCNC: 30 U/L — SIGNIFICANT CHANGE UP (ref 10–45)
ANION GAP SERPL CALC-SCNC: 11 MMOL/L — SIGNIFICANT CHANGE UP (ref 5–17)
AST SERPL-CCNC: 46 U/L — HIGH (ref 10–40)
BILIRUB SERPL-MCNC: 1.6 MG/DL — HIGH (ref 0.2–1.2)
BUN SERPL-MCNC: 30 MG/DL — HIGH (ref 7–23)
CALCIUM SERPL-MCNC: 9.3 MG/DL — SIGNIFICANT CHANGE UP (ref 8.4–10.5)
CHLORIDE SERPL-SCNC: 104 MMOL/L — SIGNIFICANT CHANGE UP (ref 96–108)
CO2 SERPL-SCNC: 26 MMOL/L — SIGNIFICANT CHANGE UP (ref 22–31)
CREAT SERPL-MCNC: 0.98 MG/DL — SIGNIFICANT CHANGE UP (ref 0.5–1.3)
EGFR: 71 ML/MIN/1.73M2 — SIGNIFICANT CHANGE UP
GLUCOSE SERPL-MCNC: 78 MG/DL — SIGNIFICANT CHANGE UP (ref 70–99)
HCT VFR BLD CALC: 37.1 % — LOW (ref 39–50)
HGB BLD-MCNC: 11.3 G/DL — LOW (ref 13–17)
MAGNESIUM SERPL-MCNC: 2.1 MG/DL — SIGNIFICANT CHANGE UP (ref 1.6–2.6)
MCHC RBC-ENTMCNC: 27 PG — SIGNIFICANT CHANGE UP (ref 27–34)
MCHC RBC-ENTMCNC: 30.5 GM/DL — LOW (ref 32–36)
MCV RBC AUTO: 88.8 FL — SIGNIFICANT CHANGE UP (ref 80–100)
NRBC # BLD: 0 /100 WBCS — SIGNIFICANT CHANGE UP (ref 0–0)
PHOSPHATE SERPL-MCNC: 2.7 MG/DL — SIGNIFICANT CHANGE UP (ref 2.5–4.5)
PLATELET # BLD AUTO: 193 K/UL — SIGNIFICANT CHANGE UP (ref 150–400)
POTASSIUM SERPL-MCNC: 3.6 MMOL/L — SIGNIFICANT CHANGE UP (ref 3.5–5.3)
POTASSIUM SERPL-SCNC: 3.6 MMOL/L — SIGNIFICANT CHANGE UP (ref 3.5–5.3)
PROT SERPL-MCNC: 5.5 G/DL — LOW (ref 6–8.3)
RBC # BLD: 4.18 M/UL — LOW (ref 4.2–5.8)
RBC # FLD: 16 % — HIGH (ref 10.3–14.5)
SODIUM SERPL-SCNC: 141 MMOL/L — SIGNIFICANT CHANGE UP (ref 135–145)
WBC # BLD: 12.39 K/UL — HIGH (ref 3.8–10.5)
WBC # FLD AUTO: 12.39 K/UL — HIGH (ref 3.8–10.5)

## 2022-06-08 PROCEDURE — 99233 SBSQ HOSP IP/OBS HIGH 50: CPT

## 2022-06-08 PROCEDURE — 99232 SBSQ HOSP IP/OBS MODERATE 35: CPT | Mod: GC

## 2022-06-08 RX ADMIN — PIPERACILLIN AND TAZOBACTAM 25 GRAM(S): 4; .5 INJECTION, POWDER, LYOPHILIZED, FOR SOLUTION INTRAVENOUS at 12:36

## 2022-06-08 RX ADMIN — FINASTERIDE 5 MILLIGRAM(S): 5 TABLET, FILM COATED ORAL at 12:37

## 2022-06-08 RX ADMIN — PIPERACILLIN AND TAZOBACTAM 25 GRAM(S): 4; .5 INJECTION, POWDER, LYOPHILIZED, FOR SOLUTION INTRAVENOUS at 04:42

## 2022-06-08 RX ADMIN — ATORVASTATIN CALCIUM 80 MILLIGRAM(S): 80 TABLET, FILM COATED ORAL at 21:42

## 2022-06-08 RX ADMIN — Medication 2 MILLIGRAM(S): at 21:42

## 2022-06-08 RX ADMIN — Medication 81 MILLIGRAM(S): at 12:36

## 2022-06-08 RX ADMIN — Medication 88 MICROGRAM(S): at 05:09

## 2022-06-08 RX ADMIN — Medication 2 MILLIGRAM(S): at 05:09

## 2022-06-08 RX ADMIN — APIXABAN 5 MILLIGRAM(S): 2.5 TABLET, FILM COATED ORAL at 05:09

## 2022-06-08 RX ADMIN — PIPERACILLIN AND TAZOBACTAM 25 GRAM(S): 4; .5 INJECTION, POWDER, LYOPHILIZED, FOR SOLUTION INTRAVENOUS at 21:43

## 2022-06-08 RX ADMIN — APIXABAN 5 MILLIGRAM(S): 2.5 TABLET, FILM COATED ORAL at 17:32

## 2022-06-08 NOTE — PROGRESS NOTE ADULT - PROBLEM SELECTOR PLAN 2
confirmed PNA on CT chest, CXR with retrocardiac infiltrate, WBC improving, likely aspiration given dysphagia and increased upper airway secretions   - c/w Zosyn (6/4-   )  - blood cx NGTD  - s/p MBS 6/6 ; Severe oropharyngeal dysphagia chronic and progressive in nature in the setting of advanced progressive disease process impacting safety efficiency of swallow function and the patient's ability to sustain nutrition PO.  - SLP reccs NPO if in line with pt's GOC  - d/w HCP Krsistine  ; she wants to c/w pleasure feeds and is aware of aspiration risk  - she is an agreement with  possible long term care placement w/palliative care ; CM facilitating  - Pt is not a candidate for inpatient hospice and home hospice is not a feasible option for family decreased flexibility/decreased ROM/impaired balance/decreased strength/pain

## 2022-06-08 NOTE — PROGRESS NOTE ADULT - PROBLEM SELECTOR PLAN 3
CT a/p imaging concerning for possible DVT in R common femoral vein  Started on Eliquis 5 mg BID (low dose per family wishes) risk and benefits of AC were explained in detail to the family  check b/l LE dopplers per heme-onc reccs

## 2022-06-08 NOTE — PROGRESS NOTE ADULT - SUBJECTIVE AND OBJECTIVE BOX
Hematology Oncology Follow-up    INTERVAL HPI/OVERNIGHT EVENTS:        VITAL SIGNS:  T(F): 97.7 (06-08-22 @ 06:50)  HR: 80 (06-08-22 @ 06:50)  BP: 100/64 (06-08-22 @ 06:50)  RR: 18 (06-08-22 @ 06:50)  SpO2: 93% (06-08-22 @ 06:50)  Wt(kg): --    06-07-22 @ 07:01  -  06-08-22 @ 07:00  --------------------------------------------------------  IN: 600 mL / OUT: 0 mL / NET: 600 mL    PHYSICAL EXAM:  GENERAL: elderly gentleman lying in bed, appears fatigued  HEAD:  Atraumatic, Normocephalic  EYES: EOMI, PERRLA, conjunctiva and sclera clear  ENMT: No oropharyngeal exudates, Moist mucous membranes  NECK: Supple, no cervical lymphadenopathy  NERVOUS SYSTEM:  alert and conversant, hard of  hearing, moving all extremities spontaneously   CHEST/LUNG: + coughing, + rhonchi, no wheezing  HEART: Regular rate and rhythm; No murmurs  ABDOMEN: Soft, Nontender, Nondistended  EXTREMITIES:  2+ radial Pulses, No cyanosis or edema  SKIN: warm, dry    Medications  MEDICATIONS  (STANDING):  apixaban 5 milliGRAM(s) Oral every 12 hours  aspirin enteric coated 81 milliGRAM(s) Oral daily  atorvastatin 80 milliGRAM(s) Oral at bedtime  finasteride 5 milliGRAM(s) Oral daily  lactated ringers. 1000 milliLiter(s) (75 mL/Hr) IV Continuous <Continuous>  lactated ringers. 1000 milliLiter(s) (80 mL/Hr) IV Continuous <Continuous>  levothyroxine 88 MICROGram(s) Oral daily  loperamide 2 milliGRAM(s) Oral every 8 hours  piperacillin/tazobactam IVPB.. 3.375 Gram(s) IV Intermittent every 8 hours    MEDICATIONS  (PRN):  guaiFENesin Oral Liquid (Sugar-Free) 100 milliGRAM(s) Oral every 6 hours PRN Cough      Allergies: No Known Allergies      LABS:                        11.3   12.39 )-----------( 193      ( 08 Jun 2022 07:26 )             37.1     06-08    141  |  104  |  30<H>  ----------------------------<  78  3.6   |  26  |  0.98    Ca    9.3      08 Jun 2022 07:25  Phos  2.7     06-08  Mg     2.1     06-08    TPro  5.5<L>  /  Alb  2.2<L>  /  TBili  1.6<H>  /  DBili  x   /  AST  46<H>  /  ALT  30  /  AlkPhos  565<H>  06-0    RADIOLOGY & ADDITIONAL TESTS:  < from: CT Abdomen and Pelvis w/ IV Cont (05.25.22 @ 16:40) >  FINDINGS:  LOWER CHEST: Dependent atelectasis left lung base. Small bilateral   pleural effusions.    LIVER: Normal size and contour. Multiple bilobar hypoenhancing masses,   some with areas of cystic degeneration suspicious for metastases.   Reference lesions as follows:  Segment 2 (3, 22) measures 3.9 x 3.0 cm  Segment 7 (3, 14) measures 3.4 x 3.1 cm.  BILE DUCTS: Normal caliber.  GALLBLADDER: Multiple gallstones.  SPLEEN: Numerous hypoattenuating lesions within the spleen, indeterminate.  PANCREAS: Within normal limits.  ADRENALS: Left adrenal mass (3, 26) measures 2.0 x 1.4 cm.  KIDNEYS/URETERS: Multiple bilateral renal cortical cysts. No   hydronephrosis.    BLADDER: Nondistended.  REPRODUCTIVE ORGANS: Prostate within normal limits.    BOWEL: No bowel obstruction. Appendix is normal.  PERITONEUM: No ascites. Spiculated mass with central calcifications in   the small bowel mesentery in the right lower quadrant (3, 52) measures   4.1 x 2.6 cm.  VESSELS: Diffuse atheromatous calcifications. Nonocclusive filling defect   in the right common femoral and external iliac vein suspicious for   thrombus.  RETROPERITONEUM/LYMPH NODES: Mildly enlarged upper abdominal and   retroperitoneal lymph nodes. Reference nodes as follows:  Portacaval lymph node (3, 30) measures 2.5 x 1.2 cm.  Left para-aortic lymph node (3, 43) measures 1.3 x 0.9 cm.  ABDOMINAL WALL: Within normal limits.  BONES: Degenerative changes thoracolumbar spine.    IMPRESSION:  Spiculated mesenteric mass suspicious for carcinoid tumor.    Multiple bilobar hepatic metastases.    Indeterminate lesions of the spleen and left adrenal gland.    Upper abdominal and retroperitoneal lymphadenopathy.    Suspicion of DVT involving the right common femoral and external iliac   veins. Suggest correlation with venous duplex study.    Examination findings were conveyed to Dr. Lozano by Dr. Guzman at 1715   hours on 5/25/2022 with read back.    < end of copied text >    < from: CT Chest No Cont (05.25.22 @ 18:20) >  FINDINGS:    LYMPH NODES: No lymphadenopathy.    HEART/VASCULATURE: Heart size is enlarged. Post CABG.    AIRWAYS/LUNGS/PLEURA: Secretions within central airways. Small right   pleural effusion. Trace loculated left pleural effusion and left pleural   thickening.    Left basilar parenchymal opacification with calcifications and volume   loss compatible with atelectasis.    Emphysema. Few clustered micronodules in the periphery of the right lower   lobe. Left apical 0.3 cm micronodule (image 54, series 3). Left upper   lobe 0.7 cm ground-glass nodule (image 64, series 3).    UPPER ABDOMEN: Bilobar hepatic lesions, splenic lesions and left adrenal   lesion better characterized at same to prior CT abdomen and pelvis.    BONES/SOFT TISSUES: Sternotomy. Degenerative changes of the spine.    IMPRESSION:    Left apical 0.3 cm nodule, left upper lobe 0.7 cm ground-glass nodule,   and clustered micronodules at the periphery of the right base;   indeterminate, but may be infectious/inflammatory in etiology. CT chest   follow-up in 3 months recommended to assess stability.    Small right pleural effusion and trace loculated left pleural effusion.    --- End of Report ---    < end of copied text >   Hematology Oncology Follow-up    INTERVAL HPI/OVERNIGHT EVENTS:  The patient reports discomfort from dry mouth. He states "I'm done!" and expresses frustration at being hospitalized. He complains of persistent coughing    VITAL SIGNS:  T(F): 97.7 (06-08-22 @ 06:50)  HR: 80 (06-08-22 @ 06:50)  BP: 100/64 (06-08-22 @ 06:50)  RR: 18 (06-08-22 @ 06:50)  SpO2: 93% (06-08-22 @ 06:50)  Wt(kg): --    06-07-22 @ 07:01  -  06-08-22 @ 07:00  --------------------------------------------------------  IN: 600 mL / OUT: 0 mL / NET: 600 mL    PHYSICAL EXAM:  GENERAL: elderly gentleman lying in bed, appears fatigued  HEAD:  Atraumatic, Normocephalic  EYES: EOMI, PERRLA, conjunctiva and sclera clear  ENMT: No oropharyngeal exudates, Moist mucous membranes  NECK: Supple, no cervical lymphadenopathy  NERVOUS SYSTEM:  alert and conversant, hard of  hearing, moving all extremities spontaneously   CHEST/LUNG: + coughing, + rhonchi, no wheezing  HEART: Regular rate and rhythm; No murmurs  ABDOMEN: Soft, Nontender, Nondistended  EXTREMITIES:  2+ radial Pulses, No cyanosis or edema  SKIN: warm, dry    Medications  MEDICATIONS  (STANDING):  apixaban 5 milliGRAM(s) Oral every 12 hours  aspirin enteric coated 81 milliGRAM(s) Oral daily  atorvastatin 80 milliGRAM(s) Oral at bedtime  finasteride 5 milliGRAM(s) Oral daily  lactated ringers. 1000 milliLiter(s) (75 mL/Hr) IV Continuous <Continuous>  lactated ringers. 1000 milliLiter(s) (80 mL/Hr) IV Continuous <Continuous>  levothyroxine 88 MICROGram(s) Oral daily  loperamide 2 milliGRAM(s) Oral every 8 hours  piperacillin/tazobactam IVPB.. 3.375 Gram(s) IV Intermittent every 8 hours    MEDICATIONS  (PRN):  guaiFENesin Oral Liquid (Sugar-Free) 100 milliGRAM(s) Oral every 6 hours PRN Cough      Allergies: No Known Allergies      LABS:                        11.3   12.39 )-----------( 193      ( 08 Jun 2022 07:26 )             37.1     06-08    141  |  104  |  30<H>  ----------------------------<  78  3.6   |  26  |  0.98    Ca    9.3      08 Jun 2022 07:25  Phos  2.7     06-08  Mg     2.1     06-08    TPro  5.5<L>  /  Alb  2.2<L>  /  TBili  1.6<H>  /  DBili  x   /  AST  46<H>  /  ALT  30  /  AlkPhos  565<H>  06-0    RADIOLOGY & ADDITIONAL TESTS:  < from: CT Abdomen and Pelvis w/ IV Cont (05.25.22 @ 16:40) >  FINDINGS:  LOWER CHEST: Dependent atelectasis left lung base. Small bilateral   pleural effusions.    LIVER: Normal size and contour. Multiple bilobar hypoenhancing masses,   some with areas of cystic degeneration suspicious for metastases.   Reference lesions as follows:  Segment 2 (3, 22) measures 3.9 x 3.0 cm  Segment 7 (3, 14) measures 3.4 x 3.1 cm.  BILE DUCTS: Normal caliber.  GALLBLADDER: Multiple gallstones.  SPLEEN: Numerous hypoattenuating lesions within the spleen, indeterminate.  PANCREAS: Within normal limits.  ADRENALS: Left adrenal mass (3, 26) measures 2.0 x 1.4 cm.  KIDNEYS/URETERS: Multiple bilateral renal cortical cysts. No   hydronephrosis.    BLADDER: Nondistended.  REPRODUCTIVE ORGANS: Prostate within normal limits.    BOWEL: No bowel obstruction. Appendix is normal.  PERITONEUM: No ascites. Spiculated mass with central calcifications in   the small bowel mesentery in the right lower quadrant (3, 52) measures   4.1 x 2.6 cm.  VESSELS: Diffuse atheromatous calcifications. Nonocclusive filling defect   in the right common femoral and external iliac vein suspicious for   thrombus.  RETROPERITONEUM/LYMPH NODES: Mildly enlarged upper abdominal and   retroperitoneal lymph nodes. Reference nodes as follows:  Portacaval lymph node (3, 30) measures 2.5 x 1.2 cm.  Left para-aortic lymph node (3, 43) measures 1.3 x 0.9 cm.  ABDOMINAL WALL: Within normal limits.  BONES: Degenerative changes thoracolumbar spine.    IMPRESSION:  Spiculated mesenteric mass suspicious for carcinoid tumor.    Multiple bilobar hepatic metastases.    Indeterminate lesions of the spleen and left adrenal gland.    Upper abdominal and retroperitoneal lymphadenopathy.    Suspicion of DVT involving the right common femoral and external iliac   veins. Suggest correlation with venous duplex study.    Examination findings were conveyed to Dr. Lozano by Dr. Guzman at 1715   hours on 5/25/2022 with read back.    < end of copied text >    < from: CT Chest No Cont (05.25.22 @ 18:20) >  FINDINGS:    LYMPH NODES: No lymphadenopathy.    HEART/VASCULATURE: Heart size is enlarged. Post CABG.    AIRWAYS/LUNGS/PLEURA: Secretions within central airways. Small right   pleural effusion. Trace loculated left pleural effusion and left pleural   thickening.    Left basilar parenchymal opacification with calcifications and volume   loss compatible with atelectasis.    Emphysema. Few clustered micronodules in the periphery of the right lower   lobe. Left apical 0.3 cm micronodule (image 54, series 3). Left upper   lobe 0.7 cm ground-glass nodule (image 64, series 3).    UPPER ABDOMEN: Bilobar hepatic lesions, splenic lesions and left adrenal   lesion better characterized at same to prior CT abdomen and pelvis.    BONES/SOFT TISSUES: Sternotomy. Degenerative changes of the spine.    IMPRESSION:    Left apical 0.3 cm nodule, left upper lobe 0.7 cm ground-glass nodule,   and clustered micronodules at the periphery of the right base;   indeterminate, but may be infectious/inflammatory in etiology. CT chest   follow-up in 3 months recommended to assess stability.    Small right pleural effusion and trace loculated left pleural effusion.    --- End of Report ---    < end of copied text >

## 2022-06-08 NOTE — PROGRESS NOTE ADULT - ATTENDING COMMENTS
Pt with hoarse voice; thin gentleman who requires help with feeding and changing. On exam, thin, anterior rhonchi, heart RRR, Extremities: no edema. 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 He has advanced disease for which the long acting Octreotide analog could keep disease stable over time but given his age and comorbidities; this may not improve his survival. If diarrhea were to return, short acting octreotide could be considered to see if symptoms could improve. Overall with poor performance status and aspiration pneumonia, we would not recommend this treatment at this time but would reconsider if pt is expected to have full recovery from PNA and able to be independent again.

## 2022-06-08 NOTE — PROGRESS NOTE ADULT - ASSESSMENT
89M former smoker w/ copd, CAD s/p CABG, hypothyroid presents for evaluation of chronic diarrhea and weight loss, with CT imaging suspicious for carcinoid tumor s/p IR guided liver biopsy, course c/b aspiration PNA.    Neuroendocrine Tumor:  5/25/22- CT AP with IVC:  Spiculated mesenteric mass suspicious for carcinoid tumor. Multiple bilobar hepatic metastases.  Indeterminate lesions of the spleen and left adrenal gland. Upper abdominal and retroperitoneal lymphadenopathy. Suspicion of DVT involving the right common femoral and external iliac veins. Suggest correlation with venous duplex study.  CT chest non contrast-  Left apical 0.3 cm nodule, left upper lobe 0.7 cm ground-glass nodule,   and clustered micronodules at the periphery of the right base; indeterminate, but may be infectious/inflammatory in etiology. CT chest follow-up in 3 months recommended to assess stability. Small right pleural effusion and trace loculated left pleural effusion.  6/1/22- IR guided liver core biopsy- positive for malignant cells. Neuroendocrine tumor, low-grade (Grade 1). Cytology slide and core biopsyconsists of loosely cohesive groups and single-lying cells with high N/C ratio, containing finely granular chromatin pattern with rare to no mitosis in a background without any definitive evidence of necrosis. Immunostains for synaptophysin, chromogranin and CD56 are positive in tumor cells. Ki-67 shows a low proliferation index of approximately 1%.     Patient should get outpatient DOTATATE PET imaging.  Check serum chromogranin A, 24 hour urine 5-HIAA level.  Goal will be to reduce secretion of 5-HIAA. Goal will be to start the patient on a somatostatin analog as first line therapy to help control symptoms. and prevent progression of disease.   Patient may be eligible for liver directed therapy with TACE or radioablation    Suspicion for DVT: Check duplex US of lower extremities to confirm DVT  Please do not hesitate to page with questions.     Poppy Samayoa MD PGY4   [phone]  Hematology-Oncology Fellow       89M former smoker w/ copd, CAD s/p CABG, hypothyroid presents for evaluation of chronic diarrhea and weight loss, with CT imaging suspicious for carcinoid tumor s/p IR guided liver biopsy, course c/b aspiration PNA.    Neuroendocrine Tumor:  5/25/22- CT AP with IVC:  Spiculated mesenteric mass suspicious for carcinoid tumor. Multiple bilobar hepatic metastases.  Indeterminate lesions of the spleen and left adrenal gland. Upper abdominal and retroperitoneal lymphadenopathy. Suspicion of DVT involving the right common femoral and external iliac veins. Suggest correlation with venous duplex study.  CT chest non contrast-  Left apical 0.3 cm nodule, left upper lobe 0.7 cm ground-glass nodule,   and clustered micronodules at the periphery of the right base; indeterminate, but may be infectious/inflammatory in etiology. CT chest follow-up in 3 months recommended to assess stability. Small right pleural effusion and trace loculated left pleural effusion.  6/1/22- IR guided liver core biopsy- positive for malignant cells. Neuroendocrine tumor, low-grade (Grade 1). Cytology slide and core biopsyconsists of loosely cohesive groups and single-lying cells with high N/C ratio, containing finely granular chromatin pattern with rare to no mitosis in a background without any definitive evidence of necrosis. Immunostains for synaptophysin, chromogranin and CD56 are positive in tumor cells. Ki-67 shows a low proliferation index of approximately 1%.     Check serum chromogranin A, 24 hour urine 5-HIAA level.  Typically for low grade metastatic neuroendocrine tumors, somatostatin analog is used as first line therapy to help control symptoms of diarrhea by reducing secretion of 5-HIAA. IF diarrhea is persistent, can start octreotide 50mg subQ q8h for symptom management.   Per RN, the patient's diarrhea  has resolved and she had not needed to provide him with immodium today.  Even more concerning is this patient's limited functional status. His dysphagia is more likely to be life-limiting than his neuroendocrine tumor. While starting octreotide may help symptoms of diarrhea, it is unlikely to significantly improve his overall debility and dysphagia. Due to his dysphagia and debility alone, he may be considered a candidate for hospice.   Please notify us regarding further goals of care discussion so that we may assist.     Suspicion for DVT: Check duplex US of lower extremities to confirm DVT    Please do not hesitate to page with questions.     Poppy Samayoa MD PGY4   [phone]  Hematology-Oncology Fellow

## 2022-06-08 NOTE — PROGRESS NOTE ADULT - SUBJECTIVE AND OBJECTIVE BOX
Patient is a 89y old  Male who presents with a chief complaint of 89M p/w chronic diarrhea and weight loss (08 Jun 2022 11:50)      SUBJECTIVE / OVERNIGHT EVENTS:  Pt seen and examined. No acute events overnight. Limited ROS on account of lethargy. Pt denies cp, sob. Per RN still with poor PO intake.    MEDICATIONS  (STANDING):  apixaban 5 milliGRAM(s) Oral every 12 hours  aspirin enteric coated 81 milliGRAM(s) Oral daily  atorvastatin 80 milliGRAM(s) Oral at bedtime  finasteride 5 milliGRAM(s) Oral daily  lactated ringers. 1000 milliLiter(s) (75 mL/Hr) IV Continuous <Continuous>  lactated ringers. 1000 milliLiter(s) (80 mL/Hr) IV Continuous <Continuous>  levothyroxine 88 MICROGram(s) Oral daily  loperamide 2 milliGRAM(s) Oral every 8 hours  piperacillin/tazobactam IVPB.. 3.375 Gram(s) IV Intermittent every 8 hours    MEDICATIONS  (PRN):  guaiFENesin Oral Liquid (Sugar-Free) 100 milliGRAM(s) Oral every 6 hours PRN Cough      Vital Signs Last 24 Hrs  T(C): 36.3 (08 Jun 2022 09:40), Max: 36.5 (07 Jun 2022 20:30)  T(F): 97.4 (08 Jun 2022 09:40), Max: 97.7 (07 Jun 2022 20:30)  HR: 67 (08 Jun 2022 09:40) (67 - 80)  BP: 101/66 (08 Jun 2022 09:40) (100/64 - 101/66)  BP(mean): 76 (08 Jun 2022 06:50) (76 - 76)  RR: 18 (08 Jun 2022 09:40) (18 - 18)  SpO2: 94% (08 Jun 2022 09:40) (93% - 94%)  CAPILLARY BLOOD GLUCOSE        I&O's Summary    07 Jun 2022 07:01  -  08 Jun 2022 07:00  --------------------------------------------------------  IN: 600 mL / OUT: 0 mL / NET: 600 mL    08 Jun 2022 07:01  -  08 Jun 2022 13:49  --------------------------------------------------------  IN: 449 mL / OUT: 0 mL / NET: 449 mL        PHYSICAL EXAM:  GENERAL: cachectic, + [place]   HEAD:  Atraumatic, Normocephalic  EYES: conjunctiva and sclera clear  NECK: Supple, No JVD  CHEST/LUNG: bibasilar crackles noted; No wheeze  HEART: Regular rate and rhythm; No murmurs, rubs, or gallops  ABDOMEN: Soft, Nontender, Nondistended; Bowel sounds present  EXTREMITIES:  2+ Peripheral Pulses, No clubbing, cyanosis, or edema  NEUROLOGY: drowsy but arousable, oriented x3, non-focal  PSYCH: calm  SKIN: No rashes or lesions    LABS:                        11.3   12.39 )-----------( 193      ( 08 Jun 2022 07:26 )             37.1     06-08    141  |  104  |  30<H>  ----------------------------<  78  3.6   |  26  |  0.98    Ca    9.3      08 Jun 2022 07:25  Phos  2.7     06-08  Mg     2.1     06-08    TPro  5.5<L>  /  Alb  2.2<L>  /  TBili  1.6<H>  /  DBili  x   /  AST  46<H>  /  ALT  30  /  AlkPhos  565<H>  06-08

## 2022-06-08 NOTE — CHART NOTE - NSCHARTNOTEFT_GEN_A_CORE
Nutrition Follow Up Note  Patient seen for: Nutrition Follow Up     Chart reviewed, events noted. Pt is a 89 yo M with former smoker with COPD, CAD s/p CABG, hypothyroid. Presented for evaluation of chronic diarrhea and weight loss. Found to have imaging concerning for metastatic carcinoid tumor. S/P liver biopsy 6/1; prelim results suggestive of low grade carcinoid tumor. See MD note 6/7 for additional details. Pt continues on antibiotics in setting of aspiration pneumonia. S/P MBS 6/6; recommendations for NPO noted, however HCP would like to continue on pleasure feeds and is aware of aspiration risk.     Source: [] Patient       [x] EMR        [x] RN        [] Family at bedside       [x] Other: interdisciplinary medical team    -If unable to interview patient:  [x] Disoriented/confused/inappropriate to interview    Diet Order:       EN Order Provides:    - Total volume:    - Kcal:       ( ___kcal/kg based on __)    - Gm Protein  ( __ g/kg based on __)    - mL free water:  Protein Modular(s) Provides:  Current Pump Rate:  EN provision: ___% EN volume goal provided in past __ days    Is current diet order appropriate/adequate? [] Yes  []  No:     PO intake :   [] >75%  Adequate    [] 50-75%  Fair       [] <50%  Poor    Nutrition-related concerns:    GI:  Last BM ___.   Bowel Regimen? [] Yes   [] No  NGT Output:  IV Fluids:  Ostomy Output:  Fistula Output:     Weights:   Daily     MEDICATIONS  (STANDING):  atorvastatin  finasteride  lactated ringers.  lactated ringers.  levothyroxine  loperamide  piperacillin/tazobactam IVPB..    Pertinent Labs: 06-07 @ 10:54: Na 139, BUN 33<H>, Cr 1.08, BG 87, K+ 4.3, Phos --, Mg --, Alk Phos 451<H>, ALT/SGPT 27, AST/SGOT 44<H>, HbA1c --      Finger Sticks:        Skin per nursing documentation:   Edema:     Estimated Energy Needs:  Estimated Protein Needs:  Estimated Fluid Needs:   [place] Equation:    Previous Nutrition Diagnosis:   Nutrition Diagnosis is: [] ongoing  [] resolved [] not applicable     New Nutrition Diagnosis: [] Not applicable    Nutrition Care Plan:  [] In Progress  [] Achieved  [] Not applicable    Nutrition Interventions:     Education Provided:       [] Yes:  [] No:        Recommendations:         [] Continue current diet order            [] Add oral nutrition supplement:     [] Discontinue current diet order. Recommend:      [] Add micronutrient supplementation:      [] Continue current micronutrient supplementation:      [] Other:     Monitoring and Evaluation:   Continue to monitor nutritional intake, tolerance to diet prescription, weights, labs, skin integrity      RD remains available upon request and will follow up per protocol Nutrition Follow Up Note  Patient seen for: Nutrition Follow Up     Chart reviewed, events noted. Pt is a 89 yo M with former smoker with COPD, CAD s/p CABG, hypothyroid. Presented for evaluation of chronic diarrhea and weight loss. Found to have imaging concerning for metastatic carcinoid tumor. S/P liver biopsy 6/1; prelim results suggestive of low grade carcinoid tumor. See MD note 6/7 for additional details. Pt continues on antibiotics in setting of aspiration pneumonia. S/P MBS 6/6; recommendations for NPO noted, however HCP would like to continue on pleasure feeds and is aware of aspiration risk.     Source: [] Patient       [x] EMR        [x] RN        [] Family at bedside       [x] Other: interdisciplinary medical team    -If unable to interview patient:  [x] Disoriented/confused/inappropriate to interview    Diet Order: Soft and Bite Sized, Mildly Thick Liquids    Nutrition-related concerns:  -S/P MBS 6/6; noted with "severe oropharyngeal dysphagia chronic and progressive in nature in the setting of advanced progressive disease process impacting safety efficiency of swallow function and the patient's ability to sustain nutrition PO". Despite SLP recommendations of NPO, pt will continue on pleasure feeds as per d/w health care proxy (aware of aspiration risk). Continues on antibiotics as ordered for pneumonia.   -Per RN, pt requires total assistance with feeds; minimal PO intake noted this AM. Pt fast asleep; noted with lethargy per nursing flow sheets.   -Intermittently receives IVF for hydration.   -Last BM: (6/8) x 1. Not on apparent bowel regimen at this time.     Weights:   Daily     MEDICATIONS  (STANDING):  atorvastatin  finasteride  lactated ringers.  lactated ringers.  levothyroxine  loperamide  piperacillin/tazobactam IVPB..    Pertinent Labs: 06-07 @ 10:54: Na 139, BUN 33<H>, Cr 1.08, BG 87, K+ 4.3, Phos --, Mg --, Alk Phos 451<H>, ALT/SGPT 27, AST/SGOT 44<H>, HbA1c --    Finger Sticks:    Skin per nursing documentation: b/l buttocks DTI; stage II left hip  Edema: none noted    (based on wt 6/1: 68kg):   Estimated Energy Needs: (27-32kcal/kg): 1836-2176cal   Estimated Protein Needs: (1.2-1.5g protein/kg): 82-102g protein    Previous Nutrition Diagnosis: malnutrition chronic severe; increased nutrient needs  Nutrition Diagnosis is: [x] ongoing  [] resolved [] not applicable     New Nutrition Diagnosis: [x] Not applicable    Nutrition Care Plan:  [x] In Progress  [] Achieved  [] Not applicable       Recommendations:      1. Defer continuation and consistency/texture of diet/fluid to medical team.   2. RD to add [place] TID with meal trays to optimize nutrient intake potential to aid in skin integrity/wound healing/wt maintenance.   3. Consider multivitamin and vitamin C supplement to aid in wound healing, prevent micronutrient deficiencies.   4. Monitor wt trends/labs/skin integrity/hydration status/bowel regularity.     Monitoring and Evaluation:   Continue to monitor nutritional intake, tolerance to diet prescription, weights, labs, skin integrity    RD remains available upon request and will follow up per protocol    Alison Kleiner, RD, [facility] Pager # [phone]

## 2022-06-09 PROCEDURE — 93970 EXTREMITY STUDY: CPT | Mod: 26

## 2022-06-09 PROCEDURE — 99233 SBSQ HOSP IP/OBS HIGH 50: CPT

## 2022-06-09 RX ORDER — ENOXAPARIN SODIUM 100 MG/ML
40 INJECTION SUBCUTANEOUS EVERY 24 HOURS
Refills: 0 | Status: DISCONTINUED | OUTPATIENT
Start: 2022-06-10 | End: 2022-06-10

## 2022-06-09 RX ADMIN — Medication 81 MILLIGRAM(S): at 12:36

## 2022-06-09 RX ADMIN — Medication 2 MILLIGRAM(S): at 05:45

## 2022-06-09 RX ADMIN — Medication 88 MICROGRAM(S): at 07:11

## 2022-06-09 RX ADMIN — PIPERACILLIN AND TAZOBACTAM 25 GRAM(S): 4; .5 INJECTION, POWDER, LYOPHILIZED, FOR SOLUTION INTRAVENOUS at 05:45

## 2022-06-09 RX ADMIN — APIXABAN 5 MILLIGRAM(S): 2.5 TABLET, FILM COATED ORAL at 05:44

## 2022-06-09 RX ADMIN — PIPERACILLIN AND TAZOBACTAM 25 GRAM(S): 4; .5 INJECTION, POWDER, LYOPHILIZED, FOR SOLUTION INTRAVENOUS at 20:29

## 2022-06-09 RX ADMIN — SODIUM CHLORIDE 75 MILLILITER(S): 9 INJECTION, SOLUTION INTRAVENOUS at 20:28

## 2022-06-09 RX ADMIN — FINASTERIDE 5 MILLIGRAM(S): 5 TABLET, FILM COATED ORAL at 12:35

## 2022-06-09 RX ADMIN — PIPERACILLIN AND TAZOBACTAM 25 GRAM(S): 4; .5 INJECTION, POWDER, LYOPHILIZED, FOR SOLUTION INTRAVENOUS at 12:35

## 2022-06-09 RX ADMIN — SODIUM CHLORIDE 75 MILLILITER(S): 9 INJECTION, SOLUTION INTRAVENOUS at 12:35

## 2022-06-09 NOTE — PROGRESS NOTE ADULT - SUBJECTIVE AND OBJECTIVE BOX
Patient is a 89y old  Male who presents with a chief complaint of 89M p/w chronic diarrhea and weight loss (08 Jun 2022 11:50)      SUBJECTIVE / OVERNIGHT EVENTS:  Pt seen and examined. No acute events overnight. Limited ROS on account of lethargic.    MEDICATIONS  (STANDING):  apixaban 5 milliGRAM(s) Oral every 12 hours  aspirin enteric coated 81 milliGRAM(s) Oral daily  atorvastatin 80 milliGRAM(s) Oral at bedtime  finasteride 5 milliGRAM(s) Oral daily  lactated ringers. 1000 milliLiter(s) (75 mL/Hr) IV Continuous <Continuous>  lactated ringers. 1000 milliLiter(s) (80 mL/Hr) IV Continuous <Continuous>  levothyroxine 88 MICROGram(s) Oral daily  loperamide 2 milliGRAM(s) Oral every 8 hours  piperacillin/tazobactam IVPB.. 3.375 Gram(s) IV Intermittent every 8 hours    MEDICATIONS  (PRN):  guaiFENesin Oral Liquid (Sugar-Free) 100 milliGRAM(s) Oral every 6 hours PRN Cough      Vital Signs Last 24 Hrs  T(C): 36.4 (09 Jun 2022 10:35), Max: 36.4 (08 Jun 2022 19:35)  T(F): 97.6 (09 Jun 2022 10:35), Max: 97.6 (09 Jun 2022 10:35)  HR: 78 (09 Jun 2022 10:35) (76 - 78)  BP: 109/65 (09 Jun 2022 10:35) (104/58 - 115/75)  BP(mean): 77 (09 Jun 2022 06:59) (77 - 77)  RR: 18 (09 Jun 2022 10:35) (16 - 18)  SpO2: 94% (09 Jun 2022 10:35) (93% - 94%)  CAPILLARY BLOOD GLUCOSE        I&O's Summary    08 Jun 2022 07:01  -  09 Jun 2022 07:00  --------------------------------------------------------  IN: 1599 mL / OUT: 0 mL / NET: 1599 mL        PHYSICAL EXAM:  GENERAL: cachectic, + [place]   HEAD:  Atraumatic, Normocephalic  EYES: conjunctiva and sclera clear  NECK: Supple, No JVD  CHEST/LUNG: bibasilar crackles noted; No wheeze  HEART: Regular rate and rhythm; No murmurs, rubs, or gallops  ABDOMEN: Soft, Nontender, Nondistended; Bowel sounds present  EXTREMITIES:  2+ Peripheral Pulses, No clubbing, cyanosis, or edema  NEUROLOGY: lethargic, oriented x 1, non-focal  PSYCH: calm  SKIN: No rashes or lesions    LABS:                        11.3   12.39 )-----------( 193      ( 08 Jun 2022 07:26 )             37.1     06-08    141  |  104  |  30<H>  ----------------------------<  78  3.6   |  26  |  0.98    Ca    9.3      08 Jun 2022 07:25  Phos  2.7     06-08  Mg     2.1     06-08    TPro  5.5<L>  /  Alb  2.2<L>  /  TBili  1.6<H>  /  DBili  x   /  AST  46<H>  /  ALT  30  /  AlkPhos  565<H>  06-08      B/L LE DOPPLERS 6/9/22  No evidence of acute deep venous thrombosis in either lower extremity.    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          Consultant(s) Notes Reviewed:  Heme- Onc    Care Discussed with Consultants/Other Providers: Heme- Onc

## 2022-06-09 NOTE — CHART NOTE - NSCHARTNOTEFT_GEN_A_CORE
Oncology Note:   I called patient's niece, Nel, and discussed with her the patient's diagnosis of neuroendocrine tumor. Neuroendocrine tumor has likely been the cause of his presenting complaint of diarrhea. Somatostatin analog like octreotide or lanreotide can help reduce symptoms of diarrhea and prevent progression, however when our team had spoken with the patient's nurse yesterday, the patient was not having diarrhea and did not require immodium during the day. Patient's major life-limiting issue at the present appears to be his overall debility and dysphagia. He requires assistance with toileting and feeding. He eats minimally. We feel it would be appropriate to consider LTC and/or hospice services for this patient. Nel expressed understanding.    Please do not hesitate to page with questions.     Poppy Samayoa MD PGY4   [phone]  Hematology-Oncology Fellow

## 2022-06-09 NOTE — PROGRESS NOTE ADULT - PROBLEM SELECTOR PLAN 3
CT a/p imaging concerning for possible DVT in R common femoral vein  Started on Eliquis 5 mg BID (low dose per family wishes) risk and benefits of AC were explained in detail to the family  LE dopplers No evidence of acute deep venous thrombosis in either lower extremity.  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  Will d/c Eliquis

## 2022-06-09 NOTE — PROGRESS NOTE ADULT - PROBLEM SELECTOR PLAN 2
confirmed PNA on CT chest, CXR with retrocardiac infiltrate, WBC improving, likely aspiration given dysphagia and increased upper airway secretions   - c/w Zosyn (6/4-   )  - blood cx NGTD  - s/p MBS 6/6 ; Severe oropharyngeal dysphagia chronic and progressive in nature in the setting of advanced progressive disease process impacting safety efficiency of swallow function and the patient's ability to sustain nutrition PO.  - SLP reccs NPO if in line with pt's GOC  - d/w HCP Helen  ; she wants to c/w pleasure feeds and is aware of aspiration risk  - Pt's clinical status is declining overall  - life expectancy is about 6 months  - for this reason he is appropriate for hospice care  - HCP is an agreement with  inpatient hospice care ; DAE chinchilla

## 2022-06-09 NOTE — PROVIDER CONTACT NOTE (MEDICATION) - BACKGROUND
89 M former smoker w/ copd, CAD s/p CABG, hypothyroid presents for evaluation of chronic diarrhea and weight loss found to have imaging concerning for metastatic carcinoid tumor.

## 2022-06-10 ENCOUNTER — TRANSCRIPTION ENCOUNTER (OUTPATIENT)
Age: 87
End: 2022-06-10

## 2022-06-10 VITALS
OXYGEN SATURATION: 92 % | HEART RATE: 75 BPM | RESPIRATION RATE: 17 BRPM | DIASTOLIC BLOOD PRESSURE: 70 MMHG | TEMPERATURE: 97 F | SYSTOLIC BLOOD PRESSURE: 101 MMHG

## 2022-06-10 LAB
ALBUMIN SERPL ELPH-MCNC: 2.2 G/DL — LOW (ref 3.3–5)
ALP SERPL-CCNC: 658 U/L — HIGH (ref 40–120)
ALT FLD-CCNC: 28 U/L — SIGNIFICANT CHANGE UP (ref 10–45)
ANION GAP SERPL CALC-SCNC: 9 MMOL/L — SIGNIFICANT CHANGE UP (ref 5–17)
AST SERPL-CCNC: 46 U/L — HIGH (ref 10–40)
BILIRUB SERPL-MCNC: 1 MG/DL — SIGNIFICANT CHANGE UP (ref 0.2–1.2)
BUN SERPL-MCNC: 30 MG/DL — HIGH (ref 7–23)
CALCIUM SERPL-MCNC: 9.5 MG/DL — SIGNIFICANT CHANGE UP (ref 8.4–10.5)
CGA FLD-MCNC: 673.7 NG/ML — HIGH (ref 0–101.8)
CHLORIDE SERPL-SCNC: 105 MMOL/L — SIGNIFICANT CHANGE UP (ref 96–108)
CO2 SERPL-SCNC: 23 MMOL/L — SIGNIFICANT CHANGE UP (ref 22–31)
CREAT SERPL-MCNC: 1.02 MG/DL — SIGNIFICANT CHANGE UP (ref 0.5–1.3)
CULTURE RESULTS: SIGNIFICANT CHANGE UP
EGFR: 68 ML/MIN/1.73M2 — SIGNIFICANT CHANGE UP
GLUCOSE SERPL-MCNC: 83 MG/DL — SIGNIFICANT CHANGE UP (ref 70–99)
HCT VFR BLD CALC: 34 % — LOW (ref 39–50)
HCT VFR BLD CALC: 37.6 % — LOW (ref 39–50)
HGB BLD-MCNC: 10.3 G/DL — LOW (ref 13–17)
HGB BLD-MCNC: 10.6 G/DL — LOW (ref 13–17)
MCHC RBC-ENTMCNC: 26.9 PG — LOW (ref 27–34)
MCHC RBC-ENTMCNC: 27.2 PG — SIGNIFICANT CHANGE UP (ref 27–34)
MCHC RBC-ENTMCNC: 28.2 GM/DL — LOW (ref 32–36)
MCHC RBC-ENTMCNC: 30.3 GM/DL — LOW (ref 32–36)
MCV RBC AUTO: 89.9 FL — SIGNIFICANT CHANGE UP (ref 80–100)
MCV RBC AUTO: 95.4 FL — SIGNIFICANT CHANGE UP (ref 80–100)
NRBC # BLD: 0 /100 WBCS — SIGNIFICANT CHANGE UP (ref 0–0)
NRBC # BLD: 0 /100 WBCS — SIGNIFICANT CHANGE UP (ref 0–0)
PLATELET # BLD AUTO: 117 K/UL — LOW (ref 150–400)
PLATELET # BLD AUTO: 186 K/UL — SIGNIFICANT CHANGE UP (ref 150–400)
POTASSIUM SERPL-MCNC: 4.8 MMOL/L — SIGNIFICANT CHANGE UP (ref 3.5–5.3)
POTASSIUM SERPL-SCNC: 4.8 MMOL/L — SIGNIFICANT CHANGE UP (ref 3.5–5.3)
PROT SERPL-MCNC: 5.9 G/DL — LOW (ref 6–8.3)
RBC # BLD: 3.78 M/UL — LOW (ref 4.2–5.8)
RBC # BLD: 3.94 M/UL — LOW (ref 4.2–5.8)
RBC # FLD: 16.2 % — HIGH (ref 10.3–14.5)
RBC # FLD: 16.4 % — HIGH (ref 10.3–14.5)
SARS-COV-2 RNA SPEC QL NAA+PROBE: SIGNIFICANT CHANGE UP
SODIUM SERPL-SCNC: 137 MMOL/L — SIGNIFICANT CHANGE UP (ref 135–145)
SPECIMEN SOURCE: SIGNIFICANT CHANGE UP
WBC # BLD: 13.68 K/UL — HIGH (ref 3.8–10.5)
WBC # BLD: 15.02 K/UL — HIGH (ref 3.8–10.5)
WBC # FLD AUTO: 13.68 K/UL — HIGH (ref 3.8–10.5)
WBC # FLD AUTO: 15.02 K/UL — HIGH (ref 3.8–10.5)

## 2022-06-10 PROCEDURE — 88342 IMHCHEM/IMCYTCHM 1ST ANTB: CPT

## 2022-06-10 PROCEDURE — 80053 COMPREHEN METABOLIC PANEL: CPT

## 2022-06-10 PROCEDURE — 85027 COMPLETE CBC AUTOMATED: CPT

## 2022-06-10 PROCEDURE — 80048 BASIC METABOLIC PNL TOTAL CA: CPT

## 2022-06-10 PROCEDURE — 82247 BILIRUBIN TOTAL: CPT

## 2022-06-10 PROCEDURE — 74230 X-RAY XM SWLNG FUNCJ C+: CPT

## 2022-06-10 PROCEDURE — U0005: CPT

## 2022-06-10 PROCEDURE — 36415 COLL VENOUS BLD VENIPUNCTURE: CPT

## 2022-06-10 PROCEDURE — 85610 PROTHROMBIN TIME: CPT

## 2022-06-10 PROCEDURE — 92611 MOTION FLUOROSCOPY/SWALLOW: CPT

## 2022-06-10 PROCEDURE — 70450 CT HEAD/BRAIN W/O DYE: CPT | Mod: MA

## 2022-06-10 PROCEDURE — 87324 CLOSTRIDIUM AG IA: CPT

## 2022-06-10 PROCEDURE — 76942 ECHO GUIDE FOR BIOPSY: CPT

## 2022-06-10 PROCEDURE — 87040 BLOOD CULTURE FOR BACTERIA: CPT

## 2022-06-10 PROCEDURE — 88173 CYTOPATH EVAL FNA REPORT: CPT

## 2022-06-10 PROCEDURE — 88360 TUMOR IMMUNOHISTOCHEM/MANUAL: CPT

## 2022-06-10 PROCEDURE — 86900 BLOOD TYPING SEROLOGIC ABO: CPT

## 2022-06-10 PROCEDURE — 85025 COMPLETE CBC W/AUTO DIFF WBC: CPT

## 2022-06-10 PROCEDURE — 97112 NEUROMUSCULAR REEDUCATION: CPT

## 2022-06-10 PROCEDURE — 82248 BILIRUBIN DIRECT: CPT

## 2022-06-10 PROCEDURE — 83540 ASSAY OF IRON: CPT

## 2022-06-10 PROCEDURE — 82607 VITAMIN B-12: CPT

## 2022-06-10 PROCEDURE — 97530 THERAPEUTIC ACTIVITIES: CPT

## 2022-06-10 PROCEDURE — 87046 STOOL CULTR AEROBIC BACT EA: CPT

## 2022-06-10 PROCEDURE — 84443 ASSAY THYROID STIM HORMONE: CPT

## 2022-06-10 PROCEDURE — 85045 AUTOMATED RETICULOCYTE COUNT: CPT

## 2022-06-10 PROCEDURE — 88307 TISSUE EXAM BY PATHOLOGIST: CPT

## 2022-06-10 PROCEDURE — 88172 CYTP DX EVAL FNA 1ST EA SITE: CPT

## 2022-06-10 PROCEDURE — 97110 THERAPEUTIC EXERCISES: CPT

## 2022-06-10 PROCEDURE — 87077 CULTURE AEROBIC IDENTIFY: CPT

## 2022-06-10 PROCEDURE — 83735 ASSAY OF MAGNESIUM: CPT

## 2022-06-10 PROCEDURE — 47000 NEEDLE BIOPSY OF LIVER PERQ: CPT

## 2022-06-10 PROCEDURE — 71250 CT THORAX DX C-: CPT | Mod: MA

## 2022-06-10 PROCEDURE — 87045 FECES CULTURE AEROBIC BACT: CPT

## 2022-06-10 PROCEDURE — 74177 CT ABD & PELVIS W/CONTRAST: CPT | Mod: MA

## 2022-06-10 PROCEDURE — 99285 EMERGENCY DEPT VISIT HI MDM: CPT

## 2022-06-10 PROCEDURE — 85730 THROMBOPLASTIN TIME PARTIAL: CPT

## 2022-06-10 PROCEDURE — U0003: CPT

## 2022-06-10 PROCEDURE — 99239 HOSP IP/OBS DSCHRG MGMT >30: CPT

## 2022-06-10 PROCEDURE — 87449 NOS EACH ORGANISM AG IA: CPT

## 2022-06-10 PROCEDURE — 82728 ASSAY OF FERRITIN: CPT

## 2022-06-10 PROCEDURE — 86850 RBC ANTIBODY SCREEN: CPT

## 2022-06-10 PROCEDURE — 82746 ASSAY OF FOLIC ACID SERUM: CPT

## 2022-06-10 PROCEDURE — 93970 EXTREMITY STUDY: CPT

## 2022-06-10 PROCEDURE — 81001 URINALYSIS AUTO W/SCOPE: CPT

## 2022-06-10 PROCEDURE — 97162 PT EVAL MOD COMPLEX 30 MIN: CPT

## 2022-06-10 PROCEDURE — 71045 X-RAY EXAM CHEST 1 VIEW: CPT

## 2022-06-10 PROCEDURE — 87086 URINE CULTURE/COLONY COUNT: CPT

## 2022-06-10 PROCEDURE — 92610 EVALUATE SWALLOWING FUNCTION: CPT

## 2022-06-10 PROCEDURE — 86316 IMMUNOASSAY TUMOR OTHER: CPT

## 2022-06-10 PROCEDURE — 84100 ASSAY OF PHOSPHORUS: CPT

## 2022-06-10 PROCEDURE — 82140 ASSAY OF AMMONIA: CPT

## 2022-06-10 PROCEDURE — 83550 IRON BINDING TEST: CPT

## 2022-06-10 PROCEDURE — 88341 IMHCHEM/IMCYTCHM EA ADD ANTB: CPT

## 2022-06-10 PROCEDURE — 83690 ASSAY OF LIPASE: CPT

## 2022-06-10 PROCEDURE — 86901 BLOOD TYPING SEROLOGIC RH(D): CPT

## 2022-06-10 RX ORDER — LOPERAMIDE HCL 2 MG
1 TABLET ORAL
Qty: 0 | Refills: 0 | DISCHARGE
Start: 2022-06-10

## 2022-06-10 RX ADMIN — FINASTERIDE 5 MILLIGRAM(S): 5 TABLET, FILM COATED ORAL at 14:37

## 2022-06-10 RX ADMIN — Medication 81 MILLIGRAM(S): at 14:37

## 2022-06-10 RX ADMIN — PIPERACILLIN AND TAZOBACTAM 25 GRAM(S): 4; .5 INJECTION, POWDER, LYOPHILIZED, FOR SOLUTION INTRAVENOUS at 04:18

## 2022-06-10 RX ADMIN — Medication 2 MILLIGRAM(S): at 14:37

## 2022-06-10 NOTE — PROGRESS NOTE ADULT - REASON FOR ADMISSION
89M p/w chronic diarrhea and weight loss

## 2022-06-10 NOTE — PROGRESS NOTE ADULT - PROBLEM SELECTOR PLAN 7
Health Care Decision Maker: Mr. Dustin Lutz (Patient- A&Ox3)  A MOLST form was completed and placed in the chart. The patient has determined that the harm of resuscitative measures outweighs perceived benefit and has designated code status to be DNR/DNI
Health Care Decision Maker: Mr. Dustin Lutz (Patient- A&Ox3)  A MOLST form was completed and placed in the chart. The patient has determined that the harm of resuscitative measures outweighs perceived benefit and has designated code status to be DNR/DNI
no signs of acute hemorrhage  - iron studies reviewed, anemia of chronic disease likely 2/2 malignancy   - b12 folate, tsh all wnl  - Hb stable
Health Care Decision Maker: Mr. Dustin Lutz (Patient- A&Ox3)    I had a face-to-face discussion with the patient for 20 minutes on 5/25->5/26 regarding advanced care planning. I provided a detailed explanation of advanced directives including CPR resuscitation, intubation, and life support measures. A MOLST form was completed and placed in the chart. The patient has determined that the harm of resuscitative measures outweighs perceived benefit and has designated code status to be DNR/DNI    Billing Code: 26144
Health Care Decision Maker: Mr. Dustin Lutz (Patient- A&Ox3)    I had a face-to-face discussion with the patient for 20 minutes on 5/25->5/26 regarding advanced care planning. I provided a detailed explanation of advanced directives including CPR resuscitation, intubation, and life support measures. A MOLST form was completed and placed in the chart. The patient has determined that the harm of resuscitative measures outweighs perceived benefit and has designated code status to be DNR/DNI    Billing Code: 62608
Health Care Decision Maker: Mr. Dustin Lutz (Patient- A&Ox3)    I had a face-to-face discussion with the patient for 20 minutes on 5/25->5/26 regarding advanced care planning. I provided a detailed explanation of advanced directives including CPR resuscitation, intubation, and life support measures. A MOLST form was completed and placed in the chart. The patient has determined that the harm of resuscitative measures outweighs perceived benefit and has designated code status to be DNR/DNI    Billing Code: 87508
no signs of acute hemorrhage  - iron studies reviewed, anemia of chronic disease likely 2/2 malignancy   - b12 folate, tsh all wnl  - Hb stable
Health Care Decision Maker: Mr. Dustin Lutz (Patient- A&Ox3)    I had a face-to-face discussion with the patient for 20 minutes on 5/25->5/26 regarding advanced care planning. I provided a detailed explanation of advanced directives including CPR resuscitation, intubation, and life support measures. A MOLST form was completed and placed in the chart. The patient has determined that the harm of resuscitative measures outweighs perceived benefit and has designated code status to be DNR/DNI    Billing Code: 81959
no signs of acute hemorrhage  - iron studies reviewed, anemia of chronic disease likely 2/2 malignancy   - b12 folate, tsh all wnl  - Hb stable
Health Care Decision Maker: Mr. Dustin Lutz (Patient- A&Ox3)  A MOLST form was completed and placed in the chart. The patient has determined that the harm of resuscitative measures outweighs perceived benefit and has designated code status to be DNR/DNI
no signs of acute hemorrhage  - iron studies reviewed, anemia of chronic disease likely 2/2 malignancy   - b12 folate, tsh all wnl  - Hb stable
Health Care Decision Maker: Mr. Dustin Lutz (Patient- A&Ox3)  A MOLST form was completed and placed in the chart. The patient has determined that the harm of resuscitative measures outweighs perceived benefit and has designated code status to be DNR/DNI
no signs of acute hemorrhage  - iron studies reviewed, anemia of chronic disease likely 2/2 malignancy   - b12 folate, tsh all wnl  - Hb stable
no signs of acute hemorrhage  - iron studies reviewed, anemia of chronic disease likely 2/2 malignancy   - b12 folate, tsh all wnl  - Hb stable

## 2022-06-10 NOTE — PROGRESS NOTE ADULT - PROBLEM SELECTOR PROBLEM 7
Advanced care planning/counseling discussion
Anemia
Advanced care planning/counseling discussion
Anemia
Advanced care planning/counseling discussion
Anemia
Anemia
Advanced care planning/counseling discussion
Anemia
Anemia

## 2022-06-10 NOTE — PROGRESS NOTE ADULT - PROBLEM SELECTOR PLAN 3
CT a/p imaging concerning for possible DVT in R common femoral vein  Started on Eliquis 5 mg BID (low dose per family wishes) risk and benefits of AC were explained in detail to the family  LE dopplers No evidence of acute deep venous thrombosis in either lower extremity.  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  Will d/c Eliquis CT a/p imaging concerning for possible DVT in R common femoral vein  Started on Eliquis 5 mg BID (low dose per family wishes) risk and benefits of AC were explained in detail to the family  LE dopplers No evidence of acute deep venous thrombosis in either lower extremity.  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   Eliquis d/johnny

## 2022-06-10 NOTE — PROGRESS NOTE ADULT - PROBLEM SELECTOR PROBLEM 5
Hypothyroid
Emphysema lung
Hypothyroid
Emphysema lung
Hypothyroid
Emphysema lung
Hypothyroid
Hypothyroid
Emphysema lung
Hypothyroid
Emphysema lung
Emphysema lung

## 2022-06-10 NOTE — PROGRESS NOTE ADULT - NSPROGADDITIONALINFOA_GEN_ALL_CORE
d/w Medicine ACP
d/w Medicine ACP Zakia
d/w Medicine ACP Maya
Medicine ACP Maya
d/w Medicine ACP Maya
d/w Medicine ACP Med
d/w Medicine ACP Rowena
d/w Medicine ACP Mario
d/w Medicine ACP Rowena
d/w Medicine ACP Zeta
d/w Medicine ACP Maya
d/w Medicine ACP Aidee
d/w Medicine ACP Maya    d/w Johana ( HCP) ; updated on plan of care.
d/w Medicine ACP Maya

## 2022-06-10 NOTE — PROGRESS NOTE ADULT - PROBLEM SELECTOR PLAN 9
Dispo: LEOBARDO when stable
Dispo: LEOABRDO vs long term care with eventual transition to hospice
Dispo: Pt's clinical status is declining overall  - life expectancy is about 6 months  - for this reason he is appropriate for hospice care  - HCP is an agreement with  inpatient hospice care ; DAE chinchilla
Dispo: LEOBARDO vs long term care with eventual transition to hospice
lovenox for dvt at ppx dosing for now
Dispo: LEOBARDO when stable
Dispo: Pt's clinical status is declining overall  - life expectancy is about 6 months  - for this reason he is appropriate for hospice care  - HCP is an agreement with  inpatient hospice care ; DAE chinchilla

## 2022-06-10 NOTE — PROGRESS NOTE ADULT - PROBLEM SELECTOR PLAN 6
no signs of acute hemorrhage  - repeat cbc, iron, tibc, ferritin, retic, b12 folate, tsh
restarted home meds
no signs of acute hemorrhage  - iron studies reviewed, anemia of chronic disease likely 2/2 malignancy   - b12 folate, tsh all wnl  - h.h stable
- c/w Synthroid
no signs of acute hemorrhage  - repeat cbc, iron, tibc, ferritin, retic, b12 folate, tsh
- c/w Synthroid
no signs of acute hemorrhage  - iron studies reviewed, anemia of chronic disease likely 2/2 malignancy   - b12 folate, tsh all wnl  - h.h stable (CBC pending results today 5/30)
no signs of acute hemorrhage  - repeat cbc, iron, tibc, ferritin, retic, b12 folate, tsh
no signs of acute hemorrhage  - iron studies reviewed, anemia of chronic disease likely 2/2 malignancy   - b12 folate, tsh all wnl  - h.h stable
- c/w Synthroid
no signs of acute hemorrhage  - iron studies reviewed, anemia of chronic disease likely 2/2 malignancy   - b12 folate, tsh all wnl  - Hb stable
no signs of acute hemorrhage  - iron studies reviewed, anemia of chronic disease likely 2/2 malignancy   - b12 folate, tsh all wnl  - h.h stable
- c/w Synthroid
no signs of acute hemorrhage  - repeat cbc, iron, tibc, ferritin, retic, b12 folate, tsh
- c/w Synthroid
no signs of acute hemorrhage  - iron studies reviewed, anemia of chronic disease likely 2/2 malignancy   - b12 folate, tsh all wnl  - h.h stable

## 2022-06-10 NOTE — PROGRESS NOTE ADULT - PROBLEM SELECTOR PROBLEM 2
DVT, lower extremity
Aspiration pneumonia
Aspiration pneumonia
DVT, lower extremity
Aspiration pneumonia
DVT, lower extremity
Aspiration pneumonia

## 2022-06-10 NOTE — PROGRESS NOTE ADULT - PROBLEM SELECTOR PROBLEM 1
Carcinoid tumor

## 2022-06-10 NOTE — PROGRESS NOTE ADULT - PROBLEM SELECTOR PLAN 2
confirmed PNA on CT chest, CXR with retrocardiac infiltrate, WBC improving, likely aspiration given dysphagia and increased upper airway secretions   - c/w Zosyn (6/4-   )  - blood cx NGTD  - s/p MBS 6/6 ; Severe oropharyngeal dysphagia chronic and progressive in nature in the setting of advanced progressive disease process impacting safety efficiency of swallow function and the patient's ability to sustain nutrition PO.  - SLP reccs NPO if in line with pt's GOC  - d/w HCP Helen  ; she wants to c/w pleasure feeds and is aware of aspiration risk  - Pt's clinical status is declining overall  - life expectancy is about 6 months  - for this reason he is appropriate for hospice care  - HCP is an agreement with  inpatient hospice care ; DAE chinchilla confirmed PNA on CT chest, CXR with retrocardiac infiltrate, WBC improving, likely aspiration given dysphagia and increased upper airway secretions   - c/w Zosyn (6/4-   )  - blood cx NGTD  - s/p MBS 6/6 ; Severe oropharyngeal dysphagia chronic and progressive in nature in the setting of advanced progressive disease process impacting safety efficiency of swallow function and the patient's ability to sustain nutrition PO.  - SLP reccs NPO if in line with pt's GOC  - d/w HCP Helen  ; she wants to c/w pleasure feeds and is aware of aspiration risk  - Pt's clinical status is declining overall  - life expectancy is about 6 months  - for this reason he is appropriate for hospice care  - HCP is an agreement with inpatient hospice care ; DAE chinchilla

## 2022-06-10 NOTE — DISCHARGE NOTE NURSING/CASE MANAGEMENT/SOCIAL WORK - PATIENT PORTAL LINK FT
You can access the FollowMyHealth Patient Portal offered by [facility] by registering at the following website: [email or web address]. By joining [facility]’s FollowMyHealth portal, you will also be able to view your health information using other applications (apps) compatible with our system.

## 2022-06-10 NOTE — PROGRESS NOTE ADULT - PROBLEM SELECTOR PLAN 4
on room air  noted on CT chest  also found to have multiple nodules on CT chest, CT head neg for mets
local wound care per RN team
local wound care per RN team
on room air  noted on CT chest  also found to have multiple nodules on CT chest, CT head neg for mets
local wound care per RN team
local wound care per RN team
on room air  noted on CT chest  also found to have multiple nodules on CT chest, CT head neg for mets
was noted on CT chest  also found to have multiple nodules on CT chest, CT head neg for mets
local wound care per RN team
on room air  noted on CT chest  also found to have multiple nodules on CT chest, CT head neg for mets
local wound care per RN team

## 2022-06-10 NOTE — PROGRESS NOTE ADULT - PROVIDER SPECIALTY LIST ADULT
Hospitalist
Intervent Radiology
Intervent Radiology
Heme/Onc
Hospitalist

## 2022-06-10 NOTE — PROGRESS NOTE ADULT - PROBLEM SELECTOR PROBLEM 3
Decubitus skin ulcer
Decubitus skin ulcer
DVT, lower extremity
Decubitus skin ulcer
DVT, lower extremity
DVT, lower extremity
Decubitus skin ulcer
DVT, lower extremity
Decubitus skin ulcer
DVT, lower extremity
Decubitus skin ulcer
DVT, lower extremity

## 2022-06-10 NOTE — DISCHARGE NOTE NURSING/CASE MANAGEMENT/SOCIAL WORK - NSDCPEFALRISK_GEN_ALL_CORE
For information on Fall & Injury Prevention, visit: [email or web address] OR  [email or web address] OR  https://www.cdc.gov/steadi/patient.html

## 2022-06-10 NOTE — PROGRESS NOTE ADULT - PROBLEM SELECTOR PROBLEM 6
Hypothyroid
Anemia
Anemia
Hypothyroid
Hypothyroid
Anemia
Hypothyroid

## 2022-06-10 NOTE — PROGRESS NOTE ADULT - PROBLEM SELECTOR PROBLEM 8
Advanced care planning/counseling discussion
Prophylactic measure
Advanced care planning/counseling discussion
Medication management
Prophylactic measure
Advanced care planning/counseling discussion
Prophylactic measure

## 2022-06-10 NOTE — PROGRESS NOTE ADULT - NUTRITIONAL ASSESSMENT
This patient has been assessed with a concern for Malnutrition and has been determined to have a diagnosis/diagnoses of Severe protein-calorie malnutrition.    This patient is being managed with:   Diet Soft and Bite Sized-  Mildly Thick Liquids (MILDTHICKLIQS)  Entered: Jun 2 2022  5:04PM    The following pending diet order is being considered for treatment of Severe protein-calorie malnutrition:  Diet Regular-  Supplement Feeding Modality:  Oral  Ensure Enlive Cans or Servings Per Day:  2       Frequency:  Daily  Entered: May 28 2022  3:11PM  
This patient has been assessed with a concern for Malnutrition and has been determined to have a diagnosis/diagnoses of Severe protein-calorie malnutrition.    This patient is being managed with:   Diet NPO after Midnight-     NPO Start Date: 31-May-2022   NPO Start Time: 23:59  Entered: May 31 2022  9:07AM    Diet Regular-  Mildly Thick Liquids (MILDTHICKLIQS)  Entered: May 29 2022  2:06PM    Diet Regular-  Supplement Feeding Modality:  Oral  Ensure Enlive Cans or Servings Per Day:  2       Frequency:  Daily  Entered: May 28 2022  3:11PM    The following pending diet order is being considered for treatment of Severe protein-calorie malnutrition:null
This patient has been assessed with a concern for Malnutrition and has been determined to have a diagnosis/diagnoses of Severe protein-calorie malnutrition.    This patient is being managed with:   Diet NPO after Midnight-     NPO Start Date: 31-May-2022   NPO Start Time: 23:59  Entered: May 31 2022  9:07AM    Diet Regular-  Mildly Thick Liquids (MILDTHICKLIQS)  Entered: May 29 2022  2:06PM    Diet Regular-  Supplement Feeding Modality:  Oral  Ensure Enlive Cans or Servings Per Day:  2       Frequency:  Daily  Entered: May 28 2022  3:11PM    The following pending diet order is being considered for treatment of Severe protein-calorie malnutrition:null
This patient has been assessed with a concern for Malnutrition and has been determined to have a diagnosis/diagnoses of Severe protein-calorie malnutrition.    This patient is being managed with:   Diet Regular-  Mildly Thick Liquids (MILDTHICKLIQS)  Entered: May 29 2022  2:06PM    Diet Regular-  Supplement Feeding Modality:  Oral  Ensure Enlive Cans or Servings Per Day:  2       Frequency:  Daily  Entered: May 28 2022  3:11PM    The following pending diet order is being considered for treatment of Severe protein-calorie malnutrition:null
This patient has been assessed with a concern for Malnutrition and has been determined to have a diagnosis/diagnoses of Severe protein-calorie malnutrition.    This patient is being managed with:   Diet Soft and Bite Sized-  Mildly Thick Liquids (MILDTHICKLIQS)  Entered: Jun 2 2022  5:04PM    The following pending diet order is being considered for treatment of Severe protein-calorie malnutrition:  Diet Regular-  Supplement Feeding Modality:  Oral  Ensure Enlive Cans or Servings Per Day:  2       Frequency:  Daily  Entered: May 28 2022  3:11PM  
This patient has been assessed with a concern for Malnutrition and has been determined to have a diagnosis/diagnoses of Severe protein-calorie malnutrition.    This patient is being managed with:   Diet NPO-  NPO for Procedure/Test     NPO Start Date: 31-May-2022   NPO Start Time: 23:59  Except Medications  Entered: May 28 2022  7:37PM    Diet Regular-  Supplement Feeding Modality:  Oral  Ensure Enlive Cans or Servings Per Day:  2       Frequency:  Daily  Entered: May 28 2022  3:11PM    Diet Regular-  Entered: May 26 2022  1:04AM    The following pending diet order is being considered for treatment of Severe protein-calorie malnutrition:null
This patient has been assessed with a concern for Malnutrition and has been determined to have a diagnosis/diagnoses of Severe protein-calorie malnutrition.    This patient is being managed with:   Diet Regular-  Mildly Thick Liquids (MILDTHICKLIQS)  Entered: May 29 2022  2:06PM    Diet Regular-  Supplement Feeding Modality:  Oral  Ensure Enlive Cans or Servings Per Day:  2       Frequency:  Daily  Entered: May 28 2022  3:11PM    The following pending diet order is being considered for treatment of Severe protein-calorie malnutrition:null
This patient has been assessed with a concern for Malnutrition and has been determined to have a diagnosis/diagnoses of Severe protein-calorie malnutrition.    This patient is being managed with:   Diet NPO after Midnight-     NPO Start Date: 31-May-2022   NPO Start Time: 23:59  Entered: May 31 2022  9:07AM    Diet Regular-  Mildly Thick Liquids (MILDTHICKLIQS)  Entered: May 29 2022  2:06PM    Diet Regular-  Supplement Feeding Modality:  Oral  Ensure Enlive Cans or Servings Per Day:  2       Frequency:  Daily  Entered: May 28 2022  3:11PM    The following pending diet order is being considered for treatment of Severe protein-calorie malnutrition:null
This patient has been assessed with a concern for Malnutrition and has been determined to have a diagnosis/diagnoses of Severe protein-calorie malnutrition.    This patient is being managed with:   Diet Soft and Bite Sized-  Mildly Thick Liquids (MILDTHICKLIQS)  Entered: Jun 2 2022  5:04PM    The following pending diet order is being considered for treatment of Severe protein-calorie malnutrition:  Diet Regular-  Supplement Feeding Modality:  Oral  Ensure Enlive Cans or Servings Per Day:  2       Frequency:  Daily  Entered: May 28 2022  3:11PM  

## 2022-06-10 NOTE — PROGRESS NOTE ADULT - PROBLEM SELECTOR PROBLEM 4
Emphysema lung
Decubitus skin ulcer
Emphysema lung
Decubitus skin ulcer
Emphysema lung
Decubitus skin ulcer
Emphysema lung
Emphysema lung
Decubitus skin ulcer
Emphysema lung
Emphysema lung
Decubitus skin ulcer
Emphysema lung
Decubitus skin ulcer

## 2022-06-10 NOTE — PROGRESS NOTE ADULT - TIME BILLING
case complexity and discharge planning. Pt is clinically stable for discharge to inpatient hospice care.    d/w ACP Rowena.

## 2022-06-10 NOTE — PROGRESS NOTE ADULT - PROBLEM SELECTOR PLAN 1
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  - NPO MN, lovenox for dvt at ppx on hold for IR biopsy, f/u with IR when to restart
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 Planned for 6/1  - cdiff neg  - started on loperamide PRN  - Encouraged use with patient.
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  -  refusing to drink thickened liquid,  - Also noted to have post op leukocytosis which is imp[roved and now 12 patient noted to have crackle / transmitted BS on exam , get CXR due crackles / transmitted breath sound on exam and  follow up on questionable retrocardiac infiltrate will repeat CXR and result is awaited
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  - Patient is refusing to drink thickened liquid, discussed the risk of aspiration on thin liquids, patient to speak with niec, will let the team know final decision on PO diet tomorrow 6/4  - c/w LR 75 mg /hr x 10 hrs    ** noted to have leukocytosis WB 21 poss post OP  CXR questionable retrocardiac infiltrate  WBC downtrending, Pt without fever monitor off abx for now  if leukocytosis worsens & there is concern for asp PNA will need to be stated on zosyn IV
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 Planned for 6/1  - cdiff neg  - started on loperamide PRN  - Encouraged use with patient.
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  - Patient is refusing to drink thickened liquid, discussed the risk of aspiration on thin liquids, patient to speak with niece today, will let the team know final decision on PO diet  - started LR 75 mg /hr x 10 hrs
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 Plan for liver biopsy for 6/1  - cdiff neg  - started on loperamide PRN  - Encouraged use with patient.
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  - NPO MN, lovenox for dvt at ppx on hold (last dose 5/30), COVID neg 5/30
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  -  refusing to drink thickened liquid,

## 2022-06-10 NOTE — PROGRESS NOTE ADULT - SUBJECTIVE AND OBJECTIVE BOX
Patient is a 89y old  Male who presents with a chief complaint of 89M p/w chronic diarrhea and weight loss (09 Jun 2022 12:59)      SUBJECTIVE / OVERNIGHT EVENTS:  Pt seen and examined. No acute events overnight.     MEDICATIONS  (STANDING):  aspirin enteric coated 81 milliGRAM(s) Oral daily  atorvastatin 80 milliGRAM(s) Oral at bedtime  enoxaparin Injectable 40 milliGRAM(s) SubCutaneous every 24 hours  finasteride 5 milliGRAM(s) Oral daily  lactated ringers. 1000 milliLiter(s) (75 mL/Hr) IV Continuous <Continuous>  lactated ringers. 1000 milliLiter(s) (80 mL/Hr) IV Continuous <Continuous>  levothyroxine 88 MICROGram(s) Oral daily  loperamide 2 milliGRAM(s) Oral every 8 hours    MEDICATIONS  (PRN):  guaiFENesin Oral Liquid (Sugar-Free) 100 milliGRAM(s) Oral every 6 hours PRN Cough      Vital Signs Last 24 Hrs  T(C): 36.3 (10 Eh 2022 12:48), Max: 36.4 (09 Jun 2022 20:12)  T(F): 97.3 (10 Eh 2022 12:48), Max: 97.5 (09 Jun 2022 20:12)  HR: 75 (10 Eh 2022 12:48) (75 - 83)  BP: 101/70 (10 Eh 2022 12:48) (99/60 - 105/67)  BP(mean): --  RR: 17 (10 Eh 2022 12:48) (17 - 19)  SpO2: 92% (10 Eh 2022 12:48) (92% - 95%)  CAPILLARY BLOOD GLUCOSE        I&O's Summary    09 Jun 2022 07:01  -  10 Eh 2022 07:00  --------------------------------------------------------  IN: 730 mL / OUT: 0 mL / NET: 730 mL    10 Eh 2022 07:01  -  10 Eh 2022 13:27  --------------------------------------------------------  IN: 120 mL / OUT: 0 mL / NET: 120 mL        PHYSICAL EXAM:  GENERAL: NAD, well-developed  HEAD:  Atraumatic, Normocephalic  EYES: EOMI, PERRLA, conjunctiva and sclera clear  NECK: Supple, No JVD  CHEST/LUNG: Clear to auscultation bilaterally; No wheeze  HEART: Regular rate and rhythm; No murmurs, rubs, or gallops  ABDOMEN: Soft, Nontender, Nondistended; Bowel sounds present  EXTREMITIES:  2+ Peripheral Pulses, No clubbing, cyanosis, or edema  PSYCH: AAOx3  NEUROLOGY: non-focal  SKIN: No rashes or lesions    LABS:                        10.3   13.68 )-----------( 117      ( 10 Eh 2022 11:04 )             34.0     06-10    137  |  105  |  30<H>  ----------------------------<  83  4.8   |  23  |  1.02    Ca    9.5      10 Eh 2022 11:04    TPro  5.9<L>  /  Alb  2.2<L>  /  TBili  1.0  /  DBili  x   /  AST  46<H>  /  ALT  28  /  AlkPhos  658<H>  06-10              RADIOLOGY & ADDITIONAL TESTS:    Imaging Personally Reviewed:    Consultant(s) Notes Reviewed:      Care Discussed with Consultants/Other Providers:   Patient is a 89y old  Male who presents with a chief complaint of 89M p/w chronic diarrhea and weight loss (09 Jun 2022 12:59)      SUBJECTIVE / OVERNIGHT EVENTS:  Pt seen and examined. No acute events overnight. Limited ROS on account of somnolence ; appears comfortable.    MEDICATIONS  (STANDING):  aspirin enteric coated 81 milliGRAM(s) Oral daily  atorvastatin 80 milliGRAM(s) Oral at bedtime  enoxaparin Injectable 40 milliGRAM(s) SubCutaneous every 24 hours  finasteride 5 milliGRAM(s) Oral daily  lactated ringers. 1000 milliLiter(s) (75 mL/Hr) IV Continuous <Continuous>  lactated ringers. 1000 milliLiter(s) (80 mL/Hr) IV Continuous <Continuous>  levothyroxine 88 MICROGram(s) Oral daily  loperamide 2 milliGRAM(s) Oral every 8 hours    MEDICATIONS  (PRN):  guaiFENesin Oral Liquid (Sugar-Free) 100 milliGRAM(s) Oral every 6 hours PRN Cough      Vital Signs Last 24 Hrs  T(C): 36.3 (10 Eh 2022 12:48), Max: 36.4 (09 Jun 2022 20:12)  T(F): 97.3 (10 Eh 2022 12:48), Max: 97.5 (09 Jun 2022 20:12)  HR: 75 (10 Eh 2022 12:48) (75 - 83)  BP: 101/70 (10 Eh 2022 12:48) (99/60 - 105/67)  BP(mean): --  RR: 17 (10 Eh 2022 12:48) (17 - 19)  SpO2: 92% (10 Eh 2022 12:48) (92% - 95%)  CAPILLARY BLOOD GLUCOSE        I&O's Summary    09 Jun 2022 07:01  -  10 Eh 2022 07:00  --------------------------------------------------------  IN: 730 mL / OUT: 0 mL / NET: 730 mL    10 Eh 2022 07:01  -  10 Eh 2022 13:27  --------------------------------------------------------  IN: 120 mL / OUT: 0 mL / NET: 120 mL        PHYSICAL EXAM:  GENERAL: cachectic, + Andreafski   HEAD:  Atraumatic, Normocephalic  EYES: conjunctiva and sclera clear  NECK: Supple, No JVD  CHEST/LUNG: decreased air entry b/l ,  No wheeze  HEART: Regular rate and rhythm; No murmurs, rubs, or gallops  ABDOMEN: Soft, Nontender, Nondistended; Bowel sounds present  EXTREMITIES:  2+ Peripheral Pulses, No clubbing, cyanosis, or edema  NEUROLOGY: lethargic, oriented x 1, non-focal  PSYCH: calm  SKIN: No rashes or lesions    LABS:                        10.3   13.68 )-----------( 117      ( 10 Eh 2022 11:04 )             34.0     06-10    137  |  105  |  30<H>  ----------------------------<  83  4.8   |  23  |  1.02    Ca    9.5      10 Eh 2022 11:04    TPro  5.9<L>  /  Alb  2.2<L>  /  TBili  1.0  /  DBili  x   /  AST  46<H>  /  ALT  28  /  AlkPhos  658<H>  06-10

## 2022-06-10 NOTE — PROGRESS NOTE ADULT - PROBLEM SELECTOR PLAN 5
was noted on CT chest  also found to have multiple nodules on CT chest, CT head neg for mets
check tsh  [facility] pharmacy team helping with med rec
restarted home meds
restarted home meds
was noted on CT chest  also found to have multiple nodules on CT chest, CT head neg for mets
restarted home meds
was noted on CT chest  also found to have multiple nodules on CT chest, CT head neg for mets
restarted home meds
was noted on CT chest  also found to have multiple nodules on CT chest, CT head neg for mets
was noted on CT chest  also found to have multiple nodules on CT chest, CT head neg for mets
restarted home meds
was noted on CT chest  also found to have multiple nodules on CT chest, CT head neg for mets
restarted home meds
restarted home meds

## 2022-07-31 RX ORDER — FINASTERIDE 5 MG/1
1 TABLET, FILM COATED ORAL
Qty: 0 | Refills: 0 | DISCHARGE

## 2022-07-31 RX ORDER — FERROUS GLUCONATE 100 %
1 POWDER (GRAM) MISCELLANEOUS
Qty: 0 | Refills: 0 | DISCHARGE

## 2022-07-31 RX ORDER — ISOSORBIDE MONONITRATE 60 MG/1
1 TABLET, EXTENDED RELEASE ORAL
Qty: 0 | Refills: 0 | DISCHARGE

## 2022-07-31 RX ORDER — ALFUZOSIN HYDROCHLORIDE 10 MG/1
1 TABLET, EXTENDED RELEASE ORAL
Qty: 0 | Refills: 0 | DISCHARGE

## 2022-07-31 RX ORDER — DIPHENOXYLATE HCL/ATROPINE 2.5-.025MG
1 TABLET ORAL
Qty: 0 | Refills: 0 | DISCHARGE

## 2022-07-31 RX ORDER — METOPROLOL TARTRATE 50 MG
1 TABLET ORAL
Qty: 0 | Refills: 0 | DISCHARGE

## 2022-07-31 RX ORDER — ATORVASTATIN CALCIUM 80 MG/1
1 TABLET, FILM COATED ORAL
Qty: 0 | Refills: 0 | DISCHARGE

## 2022-07-31 RX ORDER — LEVOTHYROXINE SODIUM 125 MCG
1 TABLET ORAL
Qty: 0 | Refills: 0 | DISCHARGE

## 2022-07-31 RX ORDER — ASPIRIN/CALCIUM CARB/MAGNESIUM 324 MG
1 TABLET ORAL
Qty: 0 | Refills: 0 | DISCHARGE

## 2023-09-04 NOTE — PATIENT PROFILE ADULT - NSPROPTRIGHTBILLOFRIGHTS_GEN_A_NUR
Constitutional: Well appearing, awake, alert, oriented to person, place, time/situation and in no apparent distress.  HEENT: Airway patent, NCAT  Resp: no conversational dyspnea, tachypnea, or signs of respiratory distress  Msk: ambulating with normal steady gait  Neuro: A&Ox3  Skin: left great toe with paronychia, normal sensation, cap refill <2 seconds, small pustule noted, ingrown toenail has been partially removed, no damage to nail bed, no streaking. patient